# Patient Record
Sex: MALE | Race: WHITE | NOT HISPANIC OR LATINO | Employment: OTHER | URBAN - METROPOLITAN AREA
[De-identification: names, ages, dates, MRNs, and addresses within clinical notes are randomized per-mention and may not be internally consistent; named-entity substitution may affect disease eponyms.]

---

## 2021-11-07 ENCOUNTER — APPOINTMENT (OUTPATIENT)
Dept: CT IMAGING | Facility: HOSPITAL | Age: 81
End: 2021-11-07

## 2021-11-07 ENCOUNTER — HOSPITAL ENCOUNTER (INPATIENT)
Facility: HOSPITAL | Age: 81
LOS: 5 days | Discharge: NURSING, REHAB OR LONG-TERM CARE | End: 2021-11-12
Attending: EMERGENCY MEDICINE | Admitting: INTERNAL MEDICINE

## 2021-11-07 ENCOUNTER — APPOINTMENT (OUTPATIENT)
Dept: GENERAL RADIOLOGY | Facility: HOSPITAL | Age: 81
End: 2021-11-07

## 2021-11-07 DIAGNOSIS — R26.2 DIFFICULTY IN WALKING: ICD-10-CM

## 2021-11-07 DIAGNOSIS — R13.12 DYSPHAGIA, OROPHARYNGEAL: ICD-10-CM

## 2021-11-07 DIAGNOSIS — Z78.9 DECREASED ACTIVITIES OF DAILY LIVING (ADL): ICD-10-CM

## 2021-11-07 DIAGNOSIS — N17.9 ACUTE RENAL FAILURE, UNSPECIFIED ACUTE RENAL FAILURE TYPE (HCC): ICD-10-CM

## 2021-11-07 DIAGNOSIS — S72.001A CLOSED FRACTURE OF RIGHT HIP, INITIAL ENCOUNTER (HCC): Primary | ICD-10-CM

## 2021-11-07 LAB
ABO GROUP BLD: NORMAL
ABO GROUP BLD: NORMAL
ALBUMIN SERPL-MCNC: 4.2 G/DL (ref 3.5–5.2)
ALBUMIN/GLOB SERPL: 1.5 G/DL
ALP SERPL-CCNC: 80 U/L (ref 39–117)
ALT SERPL W P-5'-P-CCNC: 19 U/L (ref 1–41)
ANION GAP SERPL CALCULATED.3IONS-SCNC: 11.2 MMOL/L (ref 5–15)
APTT PPP: 23.4 SECONDS (ref 22.2–34.2)
AST SERPL-CCNC: 34 U/L (ref 1–40)
BASOPHILS # BLD AUTO: 0.06 10*3/MM3 (ref 0–0.2)
BASOPHILS NFR BLD AUTO: 0.6 % (ref 0–1.5)
BILIRUB SERPL-MCNC: 0.4 MG/DL (ref 0–1.2)
BLD GP AB SCN SERPL QL: NEGATIVE
BUN SERPL-MCNC: 28 MG/DL (ref 8–23)
BUN/CREAT SERPL: 16.7 (ref 7–25)
CALCIUM SPEC-SCNC: 9.6 MG/DL (ref 8.6–10.5)
CHLORIDE SERPL-SCNC: 101 MMOL/L (ref 98–107)
CO2 SERPL-SCNC: 24.8 MMOL/L (ref 22–29)
CREAT SERPL-MCNC: 1.68 MG/DL (ref 0.76–1.27)
DEPRECATED RDW RBC AUTO: 45.2 FL (ref 37–54)
EOSINOPHIL # BLD AUTO: 0.06 10*3/MM3 (ref 0–0.4)
EOSINOPHIL NFR BLD AUTO: 0.6 % (ref 0.3–6.2)
ERYTHROCYTE [DISTWIDTH] IN BLOOD BY AUTOMATED COUNT: 13.2 % (ref 12.3–15.4)
GFR SERPL CREATININE-BSD FRML MDRD: 39 ML/MIN/1.73
GLOBULIN UR ELPH-MCNC: 2.8 GM/DL
GLUCOSE SERPL-MCNC: 112 MG/DL (ref 65–99)
HCT VFR BLD AUTO: 43.6 % (ref 37.5–51)
HGB BLD-MCNC: 14.7 G/DL (ref 13–17.7)
IMM GRANULOCYTES # BLD AUTO: 0.05 10*3/MM3 (ref 0–0.05)
IMM GRANULOCYTES NFR BLD AUTO: 0.5 % (ref 0–0.5)
INR PPP: 0.95 (ref 2–3)
LYMPHOCYTES # BLD AUTO: 1.35 10*3/MM3 (ref 0.7–3.1)
LYMPHOCYTES NFR BLD AUTO: 12.9 % (ref 19.6–45.3)
MCH RBC QN AUTO: 31.7 PG (ref 26.6–33)
MCHC RBC AUTO-ENTMCNC: 33.7 G/DL (ref 31.5–35.7)
MCV RBC AUTO: 94.2 FL (ref 79–97)
MONOCYTES # BLD AUTO: 0.75 10*3/MM3 (ref 0.1–0.9)
MONOCYTES NFR BLD AUTO: 7.1 % (ref 5–12)
NEUTROPHILS NFR BLD AUTO: 78.3 % (ref 42.7–76)
NEUTROPHILS NFR BLD AUTO: 8.23 10*3/MM3 (ref 1.7–7)
NRBC BLD AUTO-RTO: 0 /100 WBC (ref 0–0.2)
PLATELET # BLD AUTO: 200 10*3/MM3 (ref 140–450)
PMV BLD AUTO: 11.1 FL (ref 6–12)
POTASSIUM SERPL-SCNC: 5.6 MMOL/L (ref 3.5–5.2)
PROT SERPL-MCNC: 7 G/DL (ref 6–8.5)
PROTHROMBIN TIME: 10.1 SECONDS (ref 9.4–12)
RBC # BLD AUTO: 4.63 10*6/MM3 (ref 4.14–5.8)
RH BLD: POSITIVE
RH BLD: POSITIVE
SODIUM SERPL-SCNC: 137 MMOL/L (ref 136–145)
T&S EXPIRATION DATE: NORMAL
WBC # BLD AUTO: 10.5 10*3/MM3 (ref 3.4–10.8)

## 2021-11-07 PROCEDURE — 25010000002 ONDANSETRON PER 1 MG: Performed by: EMERGENCY MEDICINE

## 2021-11-07 PROCEDURE — 80053 COMPREHEN METABOLIC PANEL: CPT | Performed by: EMERGENCY MEDICINE

## 2021-11-07 PROCEDURE — 86901 BLOOD TYPING SEROLOGIC RH(D): CPT | Performed by: EMERGENCY MEDICINE

## 2021-11-07 PROCEDURE — 71045 X-RAY EXAM CHEST 1 VIEW: CPT

## 2021-11-07 PROCEDURE — 85610 PROTHROMBIN TIME: CPT | Performed by: EMERGENCY MEDICINE

## 2021-11-07 PROCEDURE — 86900 BLOOD TYPING SEROLOGIC ABO: CPT | Performed by: EMERGENCY MEDICINE

## 2021-11-07 PROCEDURE — 86850 RBC ANTIBODY SCREEN: CPT | Performed by: EMERGENCY MEDICINE

## 2021-11-07 PROCEDURE — U0003 INFECTIOUS AGENT DETECTION BY NUCLEIC ACID (DNA OR RNA); SEVERE ACUTE RESPIRATORY SYNDROME CORONAVIRUS 2 (SARS-COV-2) (CORONAVIRUS DISEASE [COVID-19]), AMPLIFIED PROBE TECHNIQUE, MAKING USE OF HIGH THROUGHPUT TECHNOLOGIES AS DESCRIBED BY CMS-2020-01-R: HCPCS | Performed by: EMERGENCY MEDICINE

## 2021-11-07 PROCEDURE — 25010000002 HYDROMORPHONE 1 MG/ML SOLUTION: Performed by: EMERGENCY MEDICINE

## 2021-11-07 PROCEDURE — 85730 THROMBOPLASTIN TIME PARTIAL: CPT | Performed by: EMERGENCY MEDICINE

## 2021-11-07 PROCEDURE — 25010000002 MORPHINE PER 10 MG: Performed by: INTERNAL MEDICINE

## 2021-11-07 PROCEDURE — 71250 CT THORAX DX C-: CPT

## 2021-11-07 PROCEDURE — 93010 ELECTROCARDIOGRAM REPORT: CPT | Performed by: INTERNAL MEDICINE

## 2021-11-07 PROCEDURE — 99284 EMERGENCY DEPT VISIT MOD MDM: CPT

## 2021-11-07 PROCEDURE — U0005 INFEC AGEN DETEC AMPLI PROBE: HCPCS | Performed by: EMERGENCY MEDICINE

## 2021-11-07 PROCEDURE — 93005 ELECTROCARDIOGRAM TRACING: CPT | Performed by: EMERGENCY MEDICINE

## 2021-11-07 PROCEDURE — 94799 UNLISTED PULMONARY SVC/PX: CPT

## 2021-11-07 PROCEDURE — 86900 BLOOD TYPING SEROLOGIC ABO: CPT

## 2021-11-07 PROCEDURE — 85025 COMPLETE CBC W/AUTO DIFF WBC: CPT | Performed by: EMERGENCY MEDICINE

## 2021-11-07 PROCEDURE — 99223 1ST HOSP IP/OBS HIGH 75: CPT | Performed by: INTERNAL MEDICINE

## 2021-11-07 PROCEDURE — 72170 X-RAY EXAM OF PELVIS: CPT

## 2021-11-07 PROCEDURE — 73552 X-RAY EXAM OF FEMUR 2/>: CPT

## 2021-11-07 PROCEDURE — 86901 BLOOD TYPING SEROLOGIC RH(D): CPT

## 2021-11-07 RX ORDER — CEFAZOLIN SODIUM 2 G/100ML
2 INJECTION, SOLUTION INTRAVENOUS
Status: COMPLETED | OUTPATIENT
Start: 2021-11-08 | End: 2021-11-08

## 2021-11-07 RX ORDER — SODIUM CHLORIDE 0.9 % (FLUSH) 0.9 %
10 SYRINGE (ML) INJECTION AS NEEDED
Status: DISCONTINUED | OUTPATIENT
Start: 2021-11-07 | End: 2021-11-12 | Stop reason: HOSPADM

## 2021-11-07 RX ORDER — HYDROCODONE BITARTRATE AND ACETAMINOPHEN 7.5; 325 MG/1; MG/1
1 TABLET ORAL EVERY 4 HOURS PRN
Status: DISCONTINUED | OUTPATIENT
Start: 2021-11-07 | End: 2021-11-08

## 2021-11-07 RX ORDER — ONDANSETRON 2 MG/ML
4 INJECTION INTRAMUSCULAR; INTRAVENOUS ONCE
Status: COMPLETED | OUTPATIENT
Start: 2021-11-07 | End: 2021-11-07

## 2021-11-07 RX ORDER — SIMVASTATIN 20 MG
20 TABLET ORAL NIGHTLY
COMMUNITY

## 2021-11-07 RX ORDER — MORPHINE SULFATE 2 MG/ML
2 INJECTION, SOLUTION INTRAMUSCULAR; INTRAVENOUS EVERY 4 HOURS PRN
Status: DISCONTINUED | OUTPATIENT
Start: 2021-11-07 | End: 2021-11-08

## 2021-11-07 RX ORDER — SODIUM CHLORIDE 0.9 % (FLUSH) 0.9 %
10 SYRINGE (ML) INJECTION EVERY 12 HOURS SCHEDULED
Status: DISCONTINUED | OUTPATIENT
Start: 2021-11-07 | End: 2021-11-12 | Stop reason: HOSPADM

## 2021-11-07 RX ORDER — HYDRALAZINE HYDROCHLORIDE 20 MG/ML
10 INJECTION INTRAMUSCULAR; INTRAVENOUS EVERY 6 HOURS PRN
Status: DISCONTINUED | OUTPATIENT
Start: 2021-11-07 | End: 2021-11-12 | Stop reason: HOSPADM

## 2021-11-07 RX ORDER — NITROGLYCERIN 0.4 MG/1
0.4 TABLET SUBLINGUAL
Status: DISCONTINUED | OUTPATIENT
Start: 2021-11-07 | End: 2021-11-12 | Stop reason: HOSPADM

## 2021-11-07 RX ORDER — ONDANSETRON 2 MG/ML
4 INJECTION INTRAMUSCULAR; INTRAVENOUS EVERY 6 HOURS PRN
Status: DISCONTINUED | OUTPATIENT
Start: 2021-11-07 | End: 2021-11-08

## 2021-11-07 RX ORDER — ASPIRIN 81 MG/1
81 TABLET ORAL DAILY
COMMUNITY
End: 2021-11-12 | Stop reason: HOSPADM

## 2021-11-07 RX ORDER — LISINOPRIL AND HYDROCHLOROTHIAZIDE 25; 20 MG/1; MG/1
1 TABLET ORAL DAILY
COMMUNITY
End: 2021-11-12 | Stop reason: HOSPADM

## 2021-11-07 RX ORDER — ACETAMINOPHEN 325 MG/1
650 TABLET ORAL EVERY 4 HOURS PRN
Status: DISCONTINUED | OUTPATIENT
Start: 2021-11-07 | End: 2021-11-12 | Stop reason: HOSPADM

## 2021-11-07 RX ORDER — ATORVASTATIN CALCIUM 10 MG/1
10 TABLET, FILM COATED ORAL DAILY
Status: DISCONTINUED | OUTPATIENT
Start: 2021-11-08 | End: 2021-11-12

## 2021-11-07 RX ORDER — SODIUM CHLORIDE 9 MG/ML
50 INJECTION, SOLUTION INTRAVENOUS CONTINUOUS
Status: DISCONTINUED | OUTPATIENT
Start: 2021-11-07 | End: 2021-11-09

## 2021-11-07 RX ADMIN — HYDROMORPHONE HYDROCHLORIDE 1 MG: 1 INJECTION, SOLUTION INTRAMUSCULAR; INTRAVENOUS; SUBCUTANEOUS at 18:42

## 2021-11-07 RX ADMIN — SODIUM CHLORIDE 100 ML/HR: 9 INJECTION, SOLUTION INTRAVENOUS at 20:03

## 2021-11-07 RX ADMIN — HYDROMORPHONE HYDROCHLORIDE 1 MG: 1 INJECTION, SOLUTION INTRAMUSCULAR; INTRAVENOUS; SUBCUTANEOUS at 16:58

## 2021-11-07 RX ADMIN — MORPHINE SULFATE 2 MG: 2 INJECTION, SOLUTION INTRAMUSCULAR; INTRAVENOUS at 21:03

## 2021-11-07 RX ADMIN — ONDANSETRON 4 MG: 2 INJECTION INTRAMUSCULAR; INTRAVENOUS at 16:58

## 2021-11-07 RX ADMIN — SODIUM CHLORIDE 100 ML/HR: 9 INJECTION, SOLUTION INTRAVENOUS at 21:03

## 2021-11-07 NOTE — ED PROVIDER NOTES
Time: 4:23 PM EST  Arrived by: ambulance  Chief Complaint: Hip Pain  History provided by: Patient  History is limited by: N/A     History of Present Illness:  Patient is a 81 y.o. year old male that presents to the emergency department with severe right hip pain that began today when he fell down onto [place]. He has not been able to move or stand on his right leg.      Hip Pain  Location:  Right hip  Quality:  Pain  Severity:  Severe  Context:  Fall  Associated symptoms: no abdominal pain, no chest pain, no diarrhea, no fever, no headaches, no nausea, no shortness of breath, no sore throat and no vomiting        Patient Care Team  Primary Care Provider: Provider, No Known    Past Medical History:     No Known Allergies  Past Medical History:   Diagnosis Date   • Hyperlipidemia    • Hypertension      Past Surgical History:   Procedure Laterality Date   • FEMUR IM NAILING/RODDING Right 11/8/2021    Procedure: FEMUR INTRAMEDULLARY NAILING/RODDING;  Surgeon: Aristides Nevarez MD;  Location: [facility];  Service: Orthopedics;  Laterality: Right;     History reviewed. No pertinent family history.    Home Medications:  Prior to Admission medications    Not on File        Social History:   Social History     Tobacco Use   • Smoking status: Former Smoker   • Smokeless tobacco: Never Used   Vaping Use   • Vaping Use: Never used   Substance Use Topics   • Alcohol use: Never   • Drug use: Never     Recent travel: not applicable     Review of Systems:  Review of Systems   Constitutional: Negative for chills and fever.   HENT: Negative for sore throat.    Eyes: Negative for photophobia.   Respiratory: Negative for shortness of breath.    Cardiovascular: Negative for chest pain.   Gastrointestinal: Negative for abdominal pain, diarrhea, nausea and vomiting.   Genitourinary: Negative for dysuria.   Musculoskeletal: Positive for arthralgias (right hip). Negative for neck pain.   Skin: Negative for wound.   Neurological:  "Negative for headaches.   All other systems reviewed and are negative.       Physical Exam:  /55 (BP Location: Right arm, Patient Position: Lying)   Pulse 92   Temp 97.7 °F (36.5 °C) (Oral)   Resp 16   Ht 172.7 cm (67.99\")   Wt 82.7 kg (182 lb 5.1 oz)   SpO2 96%   BMI 27.73 kg/m²     Physical Exam  Vitals and nursing note reviewed.   Constitutional:       General: He is not in acute distress.  HENT:      Head: Normocephalic and atraumatic.   Eyes:      Extraocular Movements: Extraocular movements intact.   Cardiovascular:      Rate and Rhythm: Normal rate and regular rhythm.   Pulmonary:      Effort: Pulmonary effort is normal. No respiratory distress.      Breath sounds: Normal breath sounds.   Abdominal:      General: Abdomen is flat.      Palpations: Abdomen is soft.      Tenderness: There is no abdominal tenderness.   Musculoskeletal:         General: Tenderness (right hip) present.      Cervical back: Normal range of motion and neck supple.      Comments: Right leg is externally rotated and shortened. Pain with any ROM of the right leg.    Skin:     General: Skin is warm and dry.      Capillary Refill: Capillary refill takes less than 2 seconds.   Neurological:      Mental Status: He is alert and oriented to person, place, and time. Mental status is at baseline.                Medications in the Emergency Department:  Medications   sodium chloride 0.9 % flush 10 mL ( Intravenous MAR Unhold 11/8/21 1918)   atorvastatin (LIPITOR) tablet 10 mg (10 mg Oral Given 11/9/21 0814)   nitroglycerin (NITROSTAT) SL tablet 0.4 mg (has no administration in time range)   sodium chloride 0.9 % flush 10 mL (10 mL Intravenous Not Given 11/9/21 0816)   sodium chloride 0.9 % flush 10 mL (has no administration in time range)   acetaminophen (TYLENOL) tablet 650 mg (has no administration in time range)   hydrALAZINE (APRESOLINE) injection 10 mg (has no administration in time range)   enoxaparin (LOVENOX) syringe 40 mg " (40 mg Subcutaneous Given 11/9/21 0814)   HYDROcodone-acetaminophen (NORCO) 7.5-325 MG per tablet 1 tablet (1 tablet Oral Given 11/9/21 0116)   morphine injection 1 mg (has no administration in time range)     And   naloxone (NARCAN) injection 0.4 mg (has no administration in time range)   HYDROcodone-acetaminophen (NORCO) 7.5-325 MG per tablet 2 tablet (2 tablets Oral Given 11/9/21 1354)   morphine injection 2 mg (has no administration in time range)     And   naloxone (NARCAN) injection 0.4 mg (has no administration in time range)   ondansetron (ZOFRAN) injection 4 mg (has no administration in time range)   sennosides-docusate (PERICOLACE) 8.6-50 MG per tablet 2 tablet (2 tablets Oral Given 11/9/21 1739)     And   polyethylene glycol (MIRALAX) packet 17 g (has no administration in time range)     And   bisacodyl (DULCOLAX) EC tablet 5 mg (has no administration in time range)     And   bisacodyl (DULCOLAX) suppository 10 mg (has no administration in time range)   aluminum-magnesium hydroxide-simethicone (MAALOX MAX) 400-400-40 MG/5ML suspension 15 mL (has no administration in time range)   calcium carbonate (TUMS) chewable tablet 500 mg (200 mg elemental) (has no administration in time range)   HYDROmorphone (DILAUDID) injection 1 mg (1 mg Intravenous Given 11/7/21 1658)   ondansetron (ZOFRAN) injection 4 mg (4 mg Intravenous Given 11/7/21 1658)   HYDROmorphone (DILAUDID) injection 1 mg (1 mg Intravenous Given 11/7/21 1842)   ceFAZolin in dextrose (ANCEF) IVPB solution 2 g (2 g Intravenous New Bag 11/8/21 1552)   ceFAZolin in dextrose (ANCEF) IVPB solution 2 g (2 g Intravenous New Bag 11/9/21 1735)        Labs  Lab Results (last 24 hours)     Procedure Component Value Units Date/Time    CBC (No Diff) [[id number]]  (Abnormal) Collected: 11/09/21 0416    Specimen: Blood Updated: 11/09/21 0447     WBC 11.66 10*3/mm3      RBC 3.56 10*6/mm3      Hemoglobin 11.4 g/dL      Hematocrit 33.8 %      MCV 94.9 fL      MCH 32.0  pg      MCHC 33.7 g/dL      RDW 13.0 %      RDW-SD 46.4 fl      MPV 11.2 fL      Platelets 158 10*3/mm3     Basic Metabolic Panel [[id number]]  (Abnormal) Collected: 11/09/21 0416    Specimen: Blood Updated: 11/09/21 0503     Glucose 149 mg/dL      BUN 22 mg/dL      Creatinine 1.25 mg/dL      Sodium 131 mmol/L      Potassium 4.4 mmol/L      Chloride 98 mmol/L      CO2 24.2 mmol/L      Calcium 8.2 mg/dL      eGFR Non African Amer 55 mL/min/1.73      BUN/Creatinine Ratio 17.6     Anion Gap 8.8 mmol/L     Narrative:      GFR Normal >60  Chronic Kidney Disease <60  Kidney Failure <15      Magnesium [[id number]]  (Normal) Collected: 11/09/21 0416    Specimen: Blood Updated: 11/09/21 0503     Magnesium 1.6 mg/dL            Imaging:  No Radiology Exams Resulted Within Past 24 Hours    Procedures:  Procedures    Progress                            Medical Decision Making:  MDM   81-year-old male patient presents after a fall.  Patient lost his balance and fell onto his right hip on asphalt.  Patient has a right mildly displaced intertrochanteric fracture with comminution of the greater and lesser trochanters.  Patient's pain was improved with pain medication.  Patient will be admitted to the hospital service and Dr. Nevarez will consult from orthopedics.  Patient is also noted to have renal failure without previous labs to compare to and an abnormal chest x-ray with ill-defined opacities in the bilateral lungs without a comparison film available.        Final diagnoses:   Closed fracture of right hip, initial encounter ([facility])   Acute renal failure, unspecified acute renal failure type ([facility])        Disposition:  ED Disposition     ED Disposition Condition Comment    Decision to Admit  Level of Care: Med/Surg [1]   Diagnosis: Closed fracture of right hip, initial encounter ([facility]) [[id number]]   Admitting Physician: KWAME TIDWELL [[id number]]   Attending Physician: KWAME TIDWELL [[id number]]   Certification: I Certify  That Inpatient Hospital Services Are Medically Necessary For Greater Than 2 Midnights            Documentation assistance provided by Carol De La Torre acting as scribe for Bc Mejia DO. Information recorded by the scribe was done at my direction and has been verified and validated by me.        Carol De La Torre  11/07/21 1626       Bc Mejia DO  11/09/21 6338

## 2021-11-08 ENCOUNTER — APPOINTMENT (OUTPATIENT)
Dept: GENERAL RADIOLOGY | Facility: HOSPITAL | Age: 81
End: 2021-11-08

## 2021-11-08 ENCOUNTER — ANESTHESIA EVENT (OUTPATIENT)
Dept: PERIOP | Facility: HOSPITAL | Age: 81
End: 2021-11-08

## 2021-11-08 ENCOUNTER — ANESTHESIA (OUTPATIENT)
Dept: PERIOP | Facility: HOSPITAL | Age: 81
End: 2021-11-08

## 2021-11-08 LAB
ANION GAP SERPL CALCULATED.3IONS-SCNC: 9.5 MMOL/L (ref 5–15)
BUN SERPL-MCNC: 25 MG/DL (ref 8–23)
BUN/CREAT SERPL: 17 (ref 7–25)
CALCIUM SPEC-SCNC: 8.8 MG/DL (ref 8.6–10.5)
CHLORIDE SERPL-SCNC: 100 MMOL/L (ref 98–107)
CO2 SERPL-SCNC: 26.5 MMOL/L (ref 22–29)
CREAT SERPL-MCNC: 1.47 MG/DL (ref 0.76–1.27)
GFR SERPL CREATININE-BSD FRML MDRD: 46 ML/MIN/1.73
GLUCOSE SERPL-MCNC: 116 MG/DL (ref 65–99)
POTASSIUM SERPL-SCNC: 4.6 MMOL/L (ref 3.5–5.2)
SARS-COV-2 RNA RESP QL NAA+PROBE: NOT DETECTED
SODIUM SERPL-SCNC: 136 MMOL/L (ref 136–145)

## 2021-11-08 PROCEDURE — 25010000002 HYDROMORPHONE 1 MG/ML SOLUTION: Performed by: NURSE ANESTHETIST, CERTIFIED REGISTERED

## 2021-11-08 PROCEDURE — C1713 ANCHOR/SCREW BN/BN,TIS/BN: HCPCS | Performed by: ORTHOPAEDIC SURGERY

## 2021-11-08 PROCEDURE — 94761 N-INVAS EAR/PLS OXIMETRY MLT: CPT

## 2021-11-08 PROCEDURE — 25010000002 DEXAMETHASONE PER 1 MG: Performed by: NURSE ANESTHETIST, CERTIFIED REGISTERED

## 2021-11-08 PROCEDURE — 94760 N-INVAS EAR/PLS OXIMETRY 1: CPT

## 2021-11-08 PROCEDURE — 25010000002 HYDROMORPHONE 1 MG/ML SOLUTION

## 2021-11-08 PROCEDURE — 99233 SBSQ HOSP IP/OBS HIGH 50: CPT | Performed by: INTERNAL MEDICINE

## 2021-11-08 PROCEDURE — 0QH636Z INSERTION OF INTRAMEDULLARY INTERNAL FIXATION DEVICE INTO RIGHT UPPER FEMUR, PERCUTANEOUS APPROACH: ICD-10-PCS | Performed by: ORTHOPAEDIC SURGERY

## 2021-11-08 PROCEDURE — 27245 TREAT THIGH FRACTURE: CPT | Performed by: ORTHOPAEDIC SURGERY

## 2021-11-08 PROCEDURE — 99222 1ST HOSP IP/OBS MODERATE 55: CPT | Performed by: ORTHOPAEDIC SURGERY

## 2021-11-08 PROCEDURE — 36415 COLL VENOUS BLD VENIPUNCTURE: CPT | Performed by: INTERNAL MEDICINE

## 2021-11-08 PROCEDURE — 25010000002 FENTANYL CITRATE (PF) 50 MCG/ML SOLUTION: Performed by: NURSE ANESTHETIST, CERTIFIED REGISTERED

## 2021-11-08 PROCEDURE — 25010000002 ONDANSETRON PER 1 MG: Performed by: INTERNAL MEDICINE

## 2021-11-08 PROCEDURE — 25010000002 PROPOFOL 10 MG/ML EMULSION: Performed by: NURSE ANESTHETIST, CERTIFIED REGISTERED

## 2021-11-08 PROCEDURE — 25010000002 MORPHINE PER 10 MG: Performed by: INTERNAL MEDICINE

## 2021-11-08 PROCEDURE — 94762 N-INVAS EAR/PLS OXIMTRY CONT: CPT

## 2021-11-08 PROCEDURE — 94799 UNLISTED PULMONARY SVC/PX: CPT

## 2021-11-08 PROCEDURE — C1769 GUIDE WIRE: HCPCS | Performed by: ORTHOPAEDIC SURGERY

## 2021-11-08 PROCEDURE — 76000 FLUOROSCOPY <1 HR PHYS/QHP: CPT

## 2021-11-08 PROCEDURE — 80048 BASIC METABOLIC PNL TOTAL CA: CPT | Performed by: INTERNAL MEDICINE

## 2021-11-08 PROCEDURE — 0 CEFAZOLIN IN DEXTROSE 2-4 GM/100ML-% SOLUTION: Performed by: ORTHOPAEDIC SURGERY

## 2021-11-08 DEVICE — ZNN CMN NAIL 11.5MMX21.5CM 125R
Type: IMPLANTABLE DEVICE | Site: FEMUR | Status: FUNCTIONAL
Brand: ZIMMER® NATURAL NAIL® SYSTEM

## 2021-11-08 DEVICE — SCRW CORT FA FUL/THRD HEX3.5 TI 5X32.5MM: Type: IMPLANTABLE DEVICE | Site: FEMUR | Status: FUNCTIONAL

## 2021-11-08 DEVICE — ZNN CMN LAG SCREW 10.5X100
Type: IMPLANTABLE DEVICE | Site: FEMUR | Status: FUNCTIONAL
Brand: ZIMMER® NATURAL NAIL® SYSTEM

## 2021-11-08 RX ORDER — NALOXONE HCL 0.4 MG/ML
0.4 VIAL (ML) INJECTION
Status: DISCONTINUED | OUTPATIENT
Start: 2021-11-08 | End: 2021-11-12 | Stop reason: HOSPADM

## 2021-11-08 RX ORDER — PHENYLEPHRINE HCL IN 0.9% NACL 1 MG/10 ML
SYRINGE (ML) INTRAVENOUS AS NEEDED
Status: DISCONTINUED | OUTPATIENT
Start: 2021-11-08 | End: 2021-11-08 | Stop reason: SURG

## 2021-11-08 RX ORDER — OXYCODONE HYDROCHLORIDE 5 MG/1
5 TABLET ORAL
Status: DISCONTINUED | OUTPATIENT
Start: 2021-11-08 | End: 2021-11-08 | Stop reason: HOSPADM

## 2021-11-08 RX ORDER — PROMETHAZINE HYDROCHLORIDE 25 MG/1
25 SUPPOSITORY RECTAL ONCE AS NEEDED
Status: DISCONTINUED | OUTPATIENT
Start: 2021-11-08 | End: 2021-11-08 | Stop reason: HOSPADM

## 2021-11-08 RX ORDER — MEPERIDINE HYDROCHLORIDE 25 MG/ML
12.5 INJECTION INTRAMUSCULAR; INTRAVENOUS; SUBCUTANEOUS
Status: DISCONTINUED | OUTPATIENT
Start: 2021-11-08 | End: 2021-11-08 | Stop reason: HOSPADM

## 2021-11-08 RX ORDER — HYDROCODONE BITARTRATE AND ACETAMINOPHEN 7.5; 325 MG/1; MG/1
2 TABLET ORAL EVERY 4 HOURS PRN
Status: DISCONTINUED | OUTPATIENT
Start: 2021-11-08 | End: 2021-11-12 | Stop reason: HOSPADM

## 2021-11-08 RX ORDER — LIDOCAINE HYDROCHLORIDE 20 MG/ML
INJECTION, SOLUTION INFILTRATION; PERINEURAL AS NEEDED
Status: DISCONTINUED | OUTPATIENT
Start: 2021-11-08 | End: 2021-11-08 | Stop reason: SURG

## 2021-11-08 RX ORDER — SODIUM CHLORIDE 9 MG/ML
9 INJECTION, SOLUTION INTRAVENOUS CONTINUOUS PRN
Status: CANCELLED | OUTPATIENT
Start: 2021-11-08

## 2021-11-08 RX ORDER — SODIUM CHLORIDE 0.9 % (FLUSH) 0.9 %
10 SYRINGE (ML) INJECTION AS NEEDED
Status: CANCELLED | OUTPATIENT
Start: 2021-11-08

## 2021-11-08 RX ORDER — ONDANSETRON 2 MG/ML
4 INJECTION INTRAMUSCULAR; INTRAVENOUS EVERY 6 HOURS PRN
Status: DISCONTINUED | OUTPATIENT
Start: 2021-11-08 | End: 2021-11-12 | Stop reason: HOSPADM

## 2021-11-08 RX ORDER — PROPOFOL 10 MG/ML
VIAL (ML) INTRAVENOUS AS NEEDED
Status: DISCONTINUED | OUTPATIENT
Start: 2021-11-08 | End: 2021-11-08 | Stop reason: SURG

## 2021-11-08 RX ORDER — ONDANSETRON 2 MG/ML
4 INJECTION INTRAMUSCULAR; INTRAVENOUS ONCE AS NEEDED
Status: DISCONTINUED | OUTPATIENT
Start: 2021-11-08 | End: 2021-11-08 | Stop reason: HOSPADM

## 2021-11-08 RX ORDER — CEFAZOLIN SODIUM 2 G/100ML
2 INJECTION, SOLUTION INTRAVENOUS EVERY 8 HOURS
Status: COMPLETED | OUTPATIENT
Start: 2021-11-09 | End: 2021-11-09

## 2021-11-08 RX ORDER — HYDROCODONE BITARTRATE AND ACETAMINOPHEN 7.5; 325 MG/1; MG/1
1 TABLET ORAL EVERY 4 HOURS PRN
Status: DISCONTINUED | OUTPATIENT
Start: 2021-11-08 | End: 2021-11-12 | Stop reason: HOSPADM

## 2021-11-08 RX ORDER — ROCURONIUM BROMIDE 10 MG/ML
INJECTION, SOLUTION INTRAVENOUS AS NEEDED
Status: DISCONTINUED | OUTPATIENT
Start: 2021-11-08 | End: 2021-11-08 | Stop reason: SURG

## 2021-11-08 RX ORDER — PROMETHAZINE HYDROCHLORIDE 12.5 MG/1
25 TABLET ORAL ONCE AS NEEDED
Status: DISCONTINUED | OUTPATIENT
Start: 2021-11-08 | End: 2021-11-08 | Stop reason: HOSPADM

## 2021-11-08 RX ORDER — DEXAMETHASONE SODIUM PHOSPHATE 4 MG/ML
INJECTION, SOLUTION INTRA-ARTICULAR; INTRALESIONAL; INTRAMUSCULAR; INTRAVENOUS; SOFT TISSUE AS NEEDED
Status: DISCONTINUED | OUTPATIENT
Start: 2021-11-08 | End: 2021-11-08 | Stop reason: SURG

## 2021-11-08 RX ORDER — SODIUM CHLORIDE, SODIUM LACTATE, POTASSIUM CHLORIDE, CALCIUM CHLORIDE 600; 310; 30; 20 MG/100ML; MG/100ML; MG/100ML; MG/100ML
INJECTION, SOLUTION INTRAVENOUS CONTINUOUS PRN
Status: DISCONTINUED | OUTPATIENT
Start: 2021-11-08 | End: 2021-11-08 | Stop reason: SURG

## 2021-11-08 RX ORDER — MORPHINE SULFATE 2 MG/ML
2 INJECTION, SOLUTION INTRAMUSCULAR; INTRAVENOUS EVERY 4 HOURS PRN
Status: DISCONTINUED | OUTPATIENT
Start: 2021-11-08 | End: 2021-11-12 | Stop reason: HOSPADM

## 2021-11-08 RX ORDER — SODIUM CHLORIDE 0.9 % (FLUSH) 0.9 %
10 SYRINGE (ML) INJECTION EVERY 12 HOURS SCHEDULED
Status: CANCELLED | OUTPATIENT
Start: 2021-11-08

## 2021-11-08 RX ORDER — MORPHINE SULFATE 2 MG/ML
1 INJECTION, SOLUTION INTRAMUSCULAR; INTRAVENOUS EVERY 4 HOURS PRN
Status: DISCONTINUED | OUTPATIENT
Start: 2021-11-08 | End: 2021-11-12 | Stop reason: HOSPADM

## 2021-11-08 RX ORDER — MAGNESIUM HYDROXIDE 1200 MG/15ML
LIQUID ORAL AS NEEDED
Status: DISCONTINUED | OUTPATIENT
Start: 2021-11-08 | End: 2021-11-08 | Stop reason: HOSPADM

## 2021-11-08 RX ORDER — FENTANYL CITRATE 50 UG/ML
INJECTION, SOLUTION INTRAMUSCULAR; INTRAVENOUS AS NEEDED
Status: DISCONTINUED | OUTPATIENT
Start: 2021-11-08 | End: 2021-11-08 | Stop reason: SURG

## 2021-11-08 RX ADMIN — SODIUM CHLORIDE, POTASSIUM CHLORIDE, SODIUM LACTATE AND CALCIUM CHLORIDE: 600; 310; 30; 20 INJECTION, SOLUTION INTRAVENOUS at 15:54

## 2021-11-08 RX ADMIN — PROPOFOL 100 MG: 10 INJECTION, EMULSION INTRAVENOUS at 16:00

## 2021-11-08 RX ADMIN — HYDROCODONE BITARTRATE AND ACETAMINOPHEN 1 TABLET: 7.5; 325 TABLET ORAL at 05:54

## 2021-11-08 RX ADMIN — ROCURONIUM BROMIDE 50 MG: 10 INJECTION INTRAVENOUS at 16:00

## 2021-11-08 RX ADMIN — SODIUM CHLORIDE, POTASSIUM CHLORIDE, SODIUM LACTATE AND CALCIUM CHLORIDE: 600; 310; 30; 20 INJECTION, SOLUTION INTRAVENOUS at 16:40

## 2021-11-08 RX ADMIN — Medication 100 MCG: at 16:50

## 2021-11-08 RX ADMIN — HYDROMORPHONE HYDROCHLORIDE 0.5 MG: 1 INJECTION, SOLUTION INTRAMUSCULAR; INTRAVENOUS; SUBCUTANEOUS at 17:32

## 2021-11-08 RX ADMIN — HYDROMORPHONE HYDROCHLORIDE 0.5 MG: 1 INJECTION, SOLUTION INTRAMUSCULAR; INTRAVENOUS; SUBCUTANEOUS at 17:22

## 2021-11-08 RX ADMIN — Medication 100 MCG: at 16:46

## 2021-11-08 RX ADMIN — SUGAMMADEX 200 MG: 100 INJECTION, SOLUTION INTRAVENOUS at 16:59

## 2021-11-08 RX ADMIN — HYDROCODONE BITARTRATE AND ACETAMINOPHEN 2 TABLET: 7.5; 325 TABLET ORAL at 20:09

## 2021-11-08 RX ADMIN — FENTANYL CITRATE 75 MCG: 50 INJECTION, SOLUTION INTRAMUSCULAR; INTRAVENOUS at 16:08

## 2021-11-08 RX ADMIN — FENTANYL CITRATE 50 MCG: 50 INJECTION, SOLUTION INTRAMUSCULAR; INTRAVENOUS at 16:40

## 2021-11-08 RX ADMIN — FENTANYL CITRATE 25 MCG: 50 INJECTION, SOLUTION INTRAMUSCULAR; INTRAVENOUS at 16:00

## 2021-11-08 RX ADMIN — FENTANYL CITRATE 50 MCG: 50 INJECTION, SOLUTION INTRAMUSCULAR; INTRAVENOUS at 16:19

## 2021-11-08 RX ADMIN — SODIUM CHLORIDE, PRESERVATIVE FREE 10 ML: 5 INJECTION INTRAVENOUS at 20:30

## 2021-11-08 RX ADMIN — DEXAMETHASONE SODIUM PHOSPHATE 4 MG: 4 INJECTION INTRA-ARTICULAR; INTRALESIONAL; INTRAMUSCULAR; INTRAVENOUS; SOFT TISSUE at 16:19

## 2021-11-08 RX ADMIN — LIDOCAINE HYDROCHLORIDE 80 MG: 20 INJECTION, SOLUTION INFILTRATION; PERINEURAL at 16:00

## 2021-11-08 RX ADMIN — ONDANSETRON 4 MG: 2 INJECTION INTRAMUSCULAR; INTRAVENOUS at 16:19

## 2021-11-08 RX ADMIN — CEFAZOLIN SODIUM 2 G: 2 INJECTION, SOLUTION INTRAVENOUS at 15:52

## 2021-11-08 RX ADMIN — MORPHINE SULFATE 2 MG: 2 INJECTION, SOLUTION INTRAMUSCULAR; INTRAVENOUS at 04:35

## 2021-11-08 NOTE — PLAN OF CARE
Goal Outcome Evaluation:  Plan of Care Reviewed With: patient        Progress: no change  Outcome Summary: Patient resting in bed, VSS, denies pain at current time, NPO for procedure, family aware of situtation

## 2021-11-08 NOTE — ANESTHESIA PREPROCEDURE EVALUATION
Anesthesia Evaluation     NPO Solid Status: > 8 hours  NPO Liquid Status: > 8 hours           Airway   Mallampati: I  TM distance: <3 FB  Neck ROM: full  No difficulty expected  Comment: Small chin.  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   GI/Hepatic/Renal/Endo      Musculoskeletal     Abdominal    Substance History      OB/GYN          Other                        Anesthesia Plan    ASA 2     general and regional   total IV anesthesia(Pt decides to  Prefer general anesthesia)    Anesthetic plan, all risks, benefits, and alternatives have been provided, discussed and informed consent has been obtained with: patient.    Plan discussed with CRNA and attending.

## 2021-11-08 NOTE — PLAN OF CARE
Goal Outcome Evaluation:   Pt stable throughout the night. Pain control has been main concern. NPO, plans for surgery today, 11/8. No other concerns.

## 2021-11-08 NOTE — OP NOTE
FEMUR INTRAMEDULLARY NAILING/RODDING  Procedure Report    Patient Name:  Sukhwinder Marrero  YOB: 1940    Date of Surgery:  11/8/2021     Indications: The patient sustained a mechanical fall and suffered a right intertrochanteric proximal femur fracture.  He was admitted to the hospital and found to be stable for surgery.  We discussed risks and benefits of surgery with the patient and he wished to proceed.  Discussed the risk of bleeding, infection, damage to neurovascular structures, heart attack, stroke, DVT/PE, anesthesia complications including death, leg length discrepancy, deep infection, malunion, nonunion, need for additional procedures, among others.  Informed consent was obtained and he wished to proceed.    Pre-op Diagnosis:   Closed fracture of right hip, initial encounter ([facility]) [S72.001A]       Post-Op Diagnosis Codes:     * Closed fracture of right hip, initial encounter ([facility]) [S72.001A]    Procedure/CPT® Codes:      Procedure(s):  Right FEMUR INTRAMEDULLARY NAILING/RODDING    Staff:  Surgeon(s):  Aristides Nevarez MD         Anesthesia: General    Estimated Blood Loss: 200ml    Implants:    Implant Name Type Inv. Item Serial No.  Lot No. LRB No. Used Action   NAIL CEPH TI 21.5CM 11.5MM SHT RT STRL - [id number] Implant NAIL CEPH TI 21.5CM 11.5MM SHT RT STRL  [employer] [id number] Right 1 Implanted   SCRW LAG CEPH TI 10.9S826XV - [id number] Implant SCRW LAG CEPH TI 10.2S524JO  [employer] [id number] Right 1 Implanted   SCRW JULIET FA FUL/THRD HEX3.5 TI 5X32.5MM - [id number] Implant SCRW JULIET FA FUL/THRD HEX3.5 TI 5X32.5MM  [employer] [id number] Right 1 Implanted       Specimen:          None        Findings: Displaced 3 part intertrochanteric proximal femur fracture    Complications: None    Description of Procedure: The operative site was marked in the preoperative holding area.  The patient was brought to the operating room.  Anesthesia was given.  The patient was placed  supine on the [place] operative table. Well-padded traction boots were placed to the lower extremities. All bony prominences were padded.  We then placed a padded perineal post. Formal time out was held.  The patient received preoperative antibiotic. Traction and internal rotation was placed across the operative lower extremity. The nonoperative leg was extended and adducted.  Fluoroscopic imaging was taken AP and lateral plane to confirm anatomic reduction.    At this point, after prepping and draping, a longitudinal incision was made just proximal to the greater trochanter.  Subcutaneous tissues and fascia were divided.  A guide pin was placed at the tip of the greater trochanter, was driven into the intramedullary canal, centered on the canal on the lateral view.  The opening reamer and soft tissue protector were then used to create an opening hole with the greater trochanter and a ball-tipped guidewire was then inserted down into the distal femur.  This was confirmed to be at the center of the knee on both AP/lateral and fluoroscopic imaging. We then began reaming sequentially the femur, reaming to 2mm greater than the chosen nail size. The nail was inserted over the guide wire. The guide arm was placed onto the nail for aiming the lag screw.  A stab incision was made at the lateral thigh, and the fascia was divided.  The trocar was then inserted to the lateral femur.  The guide pin was drilled in the center of the femoral head on the AP and lateral view, and the lag screw length was measured.  The drill was used to drill for the lag screw and then an appropriately sized lag screw was inserted. At this point, the locking bolt was placed in the proximal nail and was tightened.   The guide for the interlocking screw was placed.  A stab incision was made in the skin and the guide was placed onto the lateral femur.  The screw was inserted after drilling and measuring.  The nail insertion guide was then removed. At this  point, final fluoroscopic images were taken.  The wounds were irrigated.  They were closed with 2-0 Vicryl, skin closed with staples.  A sterile dressing was placed.  The patient was awoken from anesthesia in stable condition.  There were no complications.  All counts were correct.  Stable to recovery.          Aristides Nevarez MD     Date: 11/8/2021  Time: 17:09 EST

## 2021-11-08 NOTE — H&P (VIEW-ONLY)
[facility]   Consult Note    Patient Name: Sukhwinder Marrero  : 1940  MRN: [id number]  Primary Care Physician:  Provider, No Known  Referring Physician: No ref. provider found  Date of admission: 2021    Subjective   Subjective     Reason for Consult/ Chief Complaint: Right hip pain    HPI:  Sukhwinder Marrero is a 81 y.o. male sustained a mechanical fall yesterday around 4 PM.  He was brought to the hospital by EMS and found to have a right intertrochanteric proximal femur fracture.  He was previously ambulatory.  He denies any loss of consciousness or head injury.  He reports right hip pain, without other complaints.  He denies any chest pain or shortness of air.  He has been n.p.o. since yesterday.    Review of Systems   All systems were reviewed and negative except for those mentioned in HPI    Personal History     Past Medical History:   Diagnosis Date   • Hyperlipidemia    • Hypertension        History reviewed. No pertinent surgical history.    Family History: family history is not on file. Otherwise pertinent FHx was reviewed and not pertinent to current issue.    Social History:  reports that he has quit smoking. He has never used smokeless tobacco. He reports that he does not drink alcohol and does not use drugs.    Home Medications:  aspirin, lisinopril-hydrochlorothiazide, and simvastatin    Allergies:  No Known Allergies    Objective    Objective     Vitals:   Temp:  [97.5 °F (36.4 °C)-97.6 °F (36.4 °C)] 97.5 °F (36.4 °C)  Heart Rate:  [69-97] 97  Resp:  [18] 18  BP: (123-154)/() 127/62  Flow (L/min):  [3] 3    Physical Exam:   Constitutional: Awake, alert   Eyes: PERRLA, sclerae anicteric, no conjunctival injection   HENT: NCAT, mucous membranes moist   Neck: Supple, no thyromegaly, no lymphadenopathy, trachea midline   Respiratory: Clear to auscultation bilaterally, nonlabored respirations    Cardiovascular: RRR, no murmurs, rubs, or gallops, palpable pedal pulses  bilaterally   Gastrointestinal: Positive bowel sounds, soft, nontender, nondistended   Musculoskeletal: The right lower extremity is shortened and externally rotated.  Positive EHL, FHL, GS, TA.  Sensation intact to light touch all 5 nerves the foot.  Positive pulses.  Tender to palpation hip.  Pain with hip range of motion.   Psychiatric: Appropriate affect, cooperative   Neurologic: Oriented x 3, strength symmetric in all extremities, Cranial Nerves grossly intact to confrontation, speech clear   Skin: No rashes     Result Review    Result Review:  I have personally reviewed the results from the time of this admission to 11/8/2021 06:54 EST and agree with these findings:  [x]  Laboratory  []  Microbiology  [x]  Radiology  []  EKG/Telemetry   []  Cardiology/Vascular   []  Pathology  []  Old records  []  Other:    Most notable findings include: Right intertrochanteric proximal femur fracture  Assessment/Plan   Assessment / Plan     Brief Patient Summary:  Sukhwinder Marrero is a 81 y.o. male who has right intertrochanteric proximal femur fracture    Active Hospital Problems:  Active Hospital Problems    Diagnosis    • Closed fracture of right hip (HCC)        Plan: We discussed treatment options with the patient,  Including operative versus nonoperative treatment.  We recommended operative treatment with cephalomedullary nailing of the right femur.  Risks and benefits of the surgery were discussed and informed consent was obtained.  Plan for n.p.o. today with surgery later today if medically able.  Thank you for the consultation.        Electronically signed by Aristides Nevarez MD, 11/08/21, 6:54 AM EST.

## 2021-11-08 NOTE — CONSULTS
"Nutrition Services    Patient Name: Sukhwinder Marrero  YOB: 1940  MRN: [id number]  Admission date: 11/7/2021      CLINICAL NUTRITION ASSESSMENT      Reason for Assessment  MST score 2+     H&P:    Past Medical History:   Diagnosis Date   • Hyperlipidemia    • Hypertension         Current Problems:   Active Hospital Problems    Diagnosis    • Closed fracture of right hip (HCC)         Nutrition/Diet History         Narrative     RD following patient for MST score for patient unsure of weight loss. Patient has no weight history per documentation. Patient is currently NPO for surgical fixation of femur fx. No intake documentation at this time. RD will follow up once diet is advanced and monitor intake and any nutrition needs.        Anthropometrics        Current Height, Weight Height: 172.7 cm (67.99\")  Weight: 82.7 kg (182 lb 5.1 oz)   Current BMI Body mass index is 27.73 kg/m².       Weight Hx  Wt Readings from Last 30 Encounters:   11/07/21 2100 82.7 kg (182 lb 5.1 oz)   11/07/21 1604 82.7 kg (182 lb 5.1 oz)            Wt Change Observation none     Estimated/Assessed Needs       Energy Requirements 4384-5752 kcal/d   EST Needs (kcal/day)        Protein Requirements    EST Daily Needs (g/day) 83-99 gm/d       Fluid Requirements     Estimated Needs (mL/day) 8111-6566 ml/d     Labs/Medications         Pertinent Labs Reviewed.   Results from last 7 days   Lab Units 11/08/21  0446 11/07/21  1659   SODIUM mmol/L 136 137   POTASSIUM mmol/L 4.6 5.6*   CHLORIDE mmol/L 100 101   CO2 mmol/L 26.5 24.8   BUN mg/dL 25* 28*   CREATININE mg/dL 1.47* 1.68*   CALCIUM mg/dL 8.8 9.6   BILIRUBIN mg/dL  --  0.4   ALK PHOS U/L  --  80   ALT (SGPT) U/L  --  19   AST (SGOT) U/L  --  34   GLUCOSE mg/dL 116* 112*     Results from last 7 days   Lab Units 11/07/21  1659   HEMOGLOBIN g/dL 14.7   HEMATOCRIT % 43.6     COVID19   Date Value Ref Range Status   11/07/2021 Not Detected Not Detected - Ref. Range Final     No results " found for: HGBA1C      Pertinent Medications Reviewed.     Current Nutrition Orders & Evaluation of Intake       Oral Nutrition     Current PO Diet NPO Diet   Supplement No active supplement orders       Nutrition Diagnosis         Nutrition Dx Problem 1 Inadequate energy Intake related to inadequate oral Intake as evidenced by NPO       Nutrition Intervention         RD will follow up once diet is advanced and monitor intake and any nutrition needs.          Monitor/Evaluation        Monitor Per protocol, Weight, Diet advancement       Nutrition Discharge Plan         To be determined       Electronically signed by:  Katharine Lopez RD  11/08/21 08:56 EST

## 2021-11-08 NOTE — PROGRESS NOTES
[facility]   Hospitalist Progress Note  Date: 2021  Patient Name: Sukhwinder Marrero  : 1940  MRN: [id number]  Date of admission: 2021      Subjective   Subjective     Chief Complaint:   Mechanical fall resulting in right hip pain    Summary:   Sukhwinder Marrero is a 82 y/o male with a h/o HTN, hyperlipidemia who presents after a mechanical fall. States he was outside lifting a chair when his feet got tangled and he fell on his right side. Did not hit head or lose consciousness. Son states he has been getting progressively more unsteady on his feet lately and had another fall in his yard recently. He denies any complaints other than pain in his right hip. He denies any cardiac or pulmonary issues. Never has chest pain or sob. Is fairly active on a normal day.     Interval Followup:   Patient to have surgical repair of right hip later this afternoon with orthopedic surgery.  Given patient's chest x-ray, CT was ordered.  CT shows bilateral infiltrates seen at the bases, discussed with patient he denies any complaints of shortness of breath cough or congestion.  Patient has been in his usual state of health for some time.  Patient's labs have improved since admission, patient's potassium is back down to normal, creatinine has improved.  Patient's pain is well controlled at this time if he does not move    Review of Systems   All systems were reviewed and negative except for: Patient states if he does not move his right hip pain is controlled    Objective   Objective     Vitals:   Temp:  [97.4 °F (36.3 °C)-97.6 °F (36.4 °C)] 97.4 °F (36.3 °C)  Heart Rate:  [69-97] 77  Resp:  [18] 18  BP: (123-154)/() 126/75  Flow (L/min):  [2-3] 3  Physical Exam   Gen: NAD, conversant, alert and oriented x3  Eyes: conjunctiva clear, moist mucous membranes  HENT: Atraumatic, oropharynx clear with moist mucous membranes  Neck: No lymphadenopathy, no thyromegaly  Resp: normal respiratory effort, CTAB, no crackles  CV:  RRR, no MRGs, no peripheral edema  GI: Soft, nontender, nondistended, (+) BS.  M/S: Patient's right lower extremity shortened and externally rotated, neurovascularly distally intact  Neuro: normal motor function in all 4 ext, CN 2-12 intact    Result Review    Result Review:  I have personally reviewed the results from the time of this admission to 11/8/2021 11:03 EST and agree with these findings:  [x]  Laboratory  []  Microbiology  [x]  Radiology  [x]  EKG/Telemetry   [x]  Cardiology/Vascular   []  Pathology  []  Old records  []  Other:    Assessment/Plan   Assessment / Plan     Assessment/Plan:  Right intertrochanteric femur fracture  Mechanical ground-level fall  Bibasilar opacities on CT scan  Hyperkalemia  NAEL  Hypertension  Hyperlipidemia    Plan:  Patient admitted to the hospital for further care and management  Orthopedic surgery has been consulted, appreciate assistance  Plans for patient to have cephalomedullary nailing of the right femur  CT of chest reviewed, patient with no complaints of shortness of breath nor aspirations, continue to monitor  Patient has medications available for pain control prior to surgery  Patient remains n.p.o. at this time, will resume diet following surgery  Patient's IV fluids have been reduced, will continue at this time given NAEL  No baseline creatinine has been found for patient however has improved since admission  Patient's hyperkalemia has resolved, continue to monitor  Patient will be evaluated by PT and OT following surgery today  Patient will likely benefit from a SNF discharge given his mechanism of injury  DVT prophylaxis to be initiated following surgery per orthopedic surgery  Discussed plan with RN.    DVT prophylaxis:  Mechanical DVT prophylaxis orders are present.    CODE STATUS:          Electronically signed by Yon Guillen MD, 11/08/21, 11:03 AM EST.

## 2021-11-08 NOTE — H&P
Hospitalist H&P Note    Patient Identification:  Name: Sukhwinder Marrero  Age: 81 y.o.  MRN: [id number]                          Chief Complaint:  I fell    History of Present Illness:  82 y/o male with a h/o HTN, HPLD who presents after a mechanical fall. States he was outside lifting a chair when his feet got tangled and he fell on his R side. Did not hit head or lose consciousness. Son states he has been getting progressively more unsteady on his feet lately and had another fall in his yard recently. He denies any complaints other than pain in his R hip. He denies any cardiac or pulmonary issues. Never has chest pain or sob. Is fairly active on a normal day.     Review of Systems:  Constitutional:  No fever or chills, no weight loss  Eyes:  No blurry or double vision  HENT:  No nasal congestion or sore throat   Respiratory:  No cough or shortness of breath   Cardiovascular:  No chest pain, palpitations, or edema   GI:  No abdominal pain, nausea, vomiting, bloody stools or diarrhea   :  No dysuria or hematuria  Musculoskeletal:  No back pain, +R hip pain  Skin:  No rash, no lumps  Neurologic:  No headache, focal weakness or dizziness  Endocrine:  No polyuria or polydipsia   Lymphatic:  No swollen glands   Psychiatric:  No depression or anxiety     Past Medical History:   Diagnosis Date   • Hyperlipidemia    • Hypertension        History reviewed. No pertinent surgical history.     History reviewed. No pertinent family history. No history cancer    Social History     Tobacco Use   • Smoking status: Never Smoker   • Smokeless tobacco: Not on file   Substance Use Topics   • Alcohol use: Never        No Known Allergies    (Not in a hospital admission)      Objective:  Vitals:   Temp:  [97.6 °F (36.4 °C)] 97.6 °F (36.4 °C)  Heart Rate:  [69-91] 91  Resp:  [18] 18  BP: (134-154)/() 154/103    Physical Exam:  Gen: NAD, conversant.   Eyes: conjunctiva clear, moist mucous membranes  HENT: Atraumatic, oropharynx  clear with moist mucous membranes  Neck: No lymphadenopathy, no thyromegaly  Resp: normal respiratory effort, CTAB  CV: RRR, no MRGs, no peripheral edema  GI: Soft, nontender, nondistended, (+) BS.  Psych: Alert and Oriented x 3, Mood and affect appropriate to situation  Skin: warm and dry on palpation. No rash or ulcers on inspection.  M/S: bilateral UE and bilateral LE muscle mass and tone WNL for age  Neuro: normal motor function in all 4 ext, CN 2-12 intact    Data Review:  I have personally reviewed and interpreted all labs and imaging as well as all outside records    Labs:  Lab Results (last 24 hours)     Procedure Component Value Units Date/Time    COVID-19,CEPHEID/KEVEN/BDMAX,COR/ÁNGEL/PAD/JC IN-HOUSE(OR EMERGENT/ADD-ON),NP SWAB IN TRANSPORT MEDIA 3-4 HR TAT, RT-PCR - Swab, Nasopharynx [[id number]] Collected: 11/07/21 1920    Specimen: Swab from Nasopharynx Updated: 11/07/21 1928    Comprehensive Metabolic Panel [[id number]]  (Abnormal) Collected: 11/07/21 1659    Specimen: Blood Updated: 11/07/21 1735     Glucose 112 mg/dL      BUN 28 mg/dL      Creatinine 1.68 mg/dL      Sodium 137 mmol/L      Potassium 5.6 mmol/L      Comment: Slight hemolysis detected by analyzer. Results may be affected.        Chloride 101 mmol/L      CO2 24.8 mmol/L      Calcium 9.6 mg/dL      Total Protein 7.0 g/dL      Albumin 4.20 g/dL      ALT (SGPT) 19 U/L      AST (SGOT) 34 U/L      Alkaline Phosphatase 80 U/L      Total Bilirubin 0.4 mg/dL      eGFR Non African Amer 39 mL/min/1.73      Globulin 2.8 gm/dL      A/G Ratio 1.5 g/dL      BUN/Creatinine Ratio 16.7     Anion Gap 11.2 mmol/L     Narrative:      GFR Normal >60  Chronic Kidney Disease <60  Kidney Failure <15      aPTT [[id number]]  (Normal) Collected: 11/07/21 1659    Specimen: Blood Updated: 11/07/21 1728     PTT 23.4 seconds     Protime-INR [[id number]]  (Abnormal) Collected: 11/07/21 1659    Specimen: Blood Updated: 11/07/21 1728     Protime 10.1 Seconds      INR 0.95     Narrative:      Suggested Therapeutic Ranges For Oral Anticoagulant Therapy:  Level of Therapy                      INR Target Range  Standard Dose                            2.0-3.0  High Dose                                2.5-3.5  Patients not receiving anticoagulant  Therapy Normal Range                     0.6-1.2    CBC & Differential [[id number]]  (Abnormal) Collected: 11/07/21 1659    Specimen: Blood Updated: 11/07/21 1716    Narrative:      The following orders were created for panel order CBC & Differential.  Procedure                               Abnormality         Status                     ---------                               -----------         ------                     CBC Auto Differential[[id number]]        Abnormal            Final result                 Please view results for these tests on the individual orders.    CBC Auto Differential [[id number]]  (Abnormal) Collected: 11/07/21 1659    Specimen: Blood Updated: 11/07/21 1716     WBC 10.50 10*3/mm3      RBC 4.63 10*6/mm3      Hemoglobin 14.7 g/dL      Hematocrit 43.6 %      MCV 94.2 fL      MCH 31.7 pg      MCHC 33.7 g/dL      RDW 13.2 %      RDW-SD 45.2 fl      MPV 11.1 fL      Platelets 200 10*3/mm3      Neutrophil % 78.3 %      Lymphocyte % 12.9 %      Monocyte % 7.1 %      Eosinophil % 0.6 %      Basophil % 0.6 %      Immature Grans % 0.5 %      Neutrophils, Absolute 8.23 10*3/mm3      Lymphocytes, Absolute 1.35 10*3/mm3      Monocytes, Absolute 0.75 10*3/mm3      Eosinophils, Absolute 0.06 10*3/mm3      Basophils, Absolute 0.06 10*3/mm3      Immature Grans, Absolute 0.05 10*3/mm3      nRBC 0.0 /100 WBC           Imaging:  Imaging Results (Last 24 Hours)     Procedure Component Value Units Date/Time    CT Chest Without Contrast Diagnostic [[id number]] Resulted: 11/07/21 1910     Updated: 11/07/21 1916    XR Chest 1 View [[id number]] Collected: 11/07/21 1704     Updated: 11/07/21 1708    Narrative:      PROCEDURE: XR CHEST 1 VW      COMPARISON: None     INDICATIONS: cough     FINDINGS:   The heart is normal in size.     Ill-defined 7 cm opacity in the right lower lobe and 4.5 cm opacity in the left lower lobe may   represent pneumonia, aspiration, or possibly scarring.  We have no prior studies for comparison.     Bony structures appear intact.     CONCLUSION:   Ill-defined opacities are seen in the lower lobes with a nonspecific appearance, as above.                  JERMAN ROQUE MD         Electronically Signed and Approved By: JERMAN ROQUE MD on 11/07/2021 at 17:04                     XR Femur 2 View Right [[id number]] Collected: 11/07/21 1703     Updated: 11/07/21 1706    Narrative:      PROCEDURE: XR PELVIS 1 OR 2 VW, 11/07/2021, 16:22  XR FEMUR 2 VW RIGHT, 11/07/2021, 16:22     COMPARISON: None     INDICATIONS: RIGHT HIP PAIN POST FALL     FINDINGS:   The sacrum, pelvis, and proximal left femur appear intact.     Moderately displaced fracture is seen in the intertrochanteric region of the proximal right femur.    The superior aspect of the greater trochanter and the lesser trochanter represent separate fracture   fragments.  No underlying lytic lesion is appreciated.     Moderate tricompartmental degenerative change in the knee is consistent with osteoarthritis.     CONCLUSION:   AP pelvis and right femur series demonstrating moderately displaced fracture in the   intertrochanteric proximal right femur, as above.               JERMAN ROQUE MD         Electronically Signed and Approved By: JERMAN ROQUE MD on 11/07/2021 at 17:03                     XR Pelvis 1 or 2 View [[id number]] Collected: 11/07/21 1703     Updated: 11/07/21 1706    Narrative:      PROCEDURE: XR PELVIS 1 OR 2 VW, 11/07/2021, 16:22  XR FEMUR 2 VW RIGHT, 11/07/2021, 16:22     COMPARISON: None     INDICATIONS: RIGHT HIP PAIN POST FALL     FINDINGS:   The sacrum, pelvis, and proximal left femur appear intact.     Moderately displaced fracture is seen in the  intertrochanteric region of the proximal right femur.    The superior aspect of the greater trochanter and the lesser trochanter represent separate fracture   fragments.  No underlying lytic lesion is appreciated.     Moderate tricompartmental degenerative change in the knee is consistent with osteoarthritis.     CONCLUSION:   AP pelvis and right femur series demonstrating moderately displaced fracture in the   intertrochanteric proximal right femur, as above.               JERMAN ROQUE MD         Electronically Signed and Approved By: JERMAN ROQUE MD on 11/07/2021 at 17:03                           Assessment:    Closed fracture of right hip (HCC)      Plan:  R Intertrochanteric femur fracture  - xray with moderately displaced fracture in the intertrochanteric proximal right femur  - pain control  - ortho to eval  - npo, hold any blood thinners    Abnormal CXR  - CXR shows some ill-defined, nonspecific, opacities in b/l lower lobes  - CT chest ordered to further eval    Hyperkalemia  - mild  - will hold lisinopril and recheck in am  - treat if still trending up    NAEL vs CKD3  - unclear what baseline Cr is, no prior to compare to  - gentle IVF overnight and recheck in am  - holding lisinopril and HCTZ    HTN  - holding home meds for above  - prn hydralazine    HPLD  - statin    Lamont Grossman MD  Hospitalist Group  11/7/2021  19:29 EST

## 2021-11-08 NOTE — CONSULTS
[facility]   Consult Note    Patient Name: Sukhwinder Marrero  : 1940  MRN: [id number]  Primary Care Physician:  Provider, No Known  Referring Physician: No ref. provider found  Date of admission: 2021    Subjective   Subjective     Reason for Consult/ Chief Complaint: Right hip pain    HPI:  Sukhwinder Marrero is a 81 y.o. male sustained a mechanical fall yesterday around 4 PM.  He was brought to the hospital by EMS and found to have a right intertrochanteric proximal femur fracture.  He was previously ambulatory.  He denies any loss of consciousness or head injury.  He reports right hip pain, without other complaints.  He denies any chest pain or shortness of air.  He has been n.p.o. since yesterday.    Review of Systems   All systems were reviewed and negative except for those mentioned in HPI    Personal History     Past Medical History:   Diagnosis Date   • Hyperlipidemia    • Hypertension        History reviewed. No pertinent surgical history.    Family History: family history is not on file. Otherwise pertinent FHx was reviewed and not pertinent to current issue.    Social History:  reports that he has quit smoking. He has never used smokeless tobacco. He reports that he does not drink alcohol and does not use drugs.    Home Medications:  aspirin, lisinopril-hydrochlorothiazide, and simvastatin    Allergies:  No Known Allergies    Objective    Objective     Vitals:   Temp:  [97.5 °F (36.4 °C)-97.6 °F (36.4 °C)] 97.5 °F (36.4 °C)  Heart Rate:  [69-97] 97  Resp:  [18] 18  BP: (123-154)/() 127/62  Flow (L/min):  [3] 3    Physical Exam:   Constitutional: Awake, alert   Eyes: PERRLA, sclerae anicteric, no conjunctival injection   HENT: NCAT, mucous membranes moist   Neck: Supple, no thyromegaly, no lymphadenopathy, trachea midline   Respiratory: Clear to auscultation bilaterally, nonlabored respirations    Cardiovascular: RRR, no murmurs, rubs, or gallops, palpable pedal pulses  bilaterally   Gastrointestinal: Positive bowel sounds, soft, nontender, nondistended   Musculoskeletal: The right lower extremity is shortened and externally rotated.  Positive EHL, FHL, GS, TA.  Sensation intact to light touch all 5 nerves the foot.  Positive pulses.  Tender to palpation hip.  Pain with hip range of motion.   Psychiatric: Appropriate affect, cooperative   Neurologic: Oriented x 3, strength symmetric in all extremities, Cranial Nerves grossly intact to confrontation, speech clear   Skin: No rashes     Result Review    Result Review:  I have personally reviewed the results from the time of this admission to 11/8/2021 06:54 EST and agree with these findings:  [x]  Laboratory  []  Microbiology  [x]  Radiology  []  EKG/Telemetry   []  Cardiology/Vascular   []  Pathology  []  Old records  []  Other:    Most notable findings include: Right intertrochanteric proximal femur fracture  Assessment/Plan   Assessment / Plan     Brief Patient Summary:  Sukhwinder Marrero is a 81 y.o. male who has right intertrochanteric proximal femur fracture    Active Hospital Problems:  Active Hospital Problems    Diagnosis    • Closed fracture of right hip (HCC)        Plan: We discussed treatment options with the patient,  Including operative versus nonoperative treatment.  We recommended operative treatment with cephalomedullary nailing of the right femur.  Risks and benefits of the surgery were discussed and informed consent was obtained.  Plan for n.p.o. today with surgery later today if medically able.  Thank you for the consultation.        Electronically signed by Aristides Nevarez MD, 11/08/21, 6:54 AM EST.

## 2021-11-08 NOTE — ANESTHESIA POSTPROCEDURE EVALUATION
Patient: Sukhwinder Marrero    Procedure Summary     Date: 11/08/21 Room / Location: [facility] OR 03 / [facility] MAIN OR    Anesthesia Start: 1554 Anesthesia Stop: 1719    Procedure: FEMUR INTRAMEDULLARY NAILING/RODDING (Right Thigh) Diagnosis:       Closed fracture of right hip, initial encounter ([facility])      (Closed fracture of right hip, initial encounter ([facility]) [S72.001A])    Surgeons: Aristides Nevarez MD Provider:     Anesthesia Type: general, regional ASA Status: 2          Anesthesia Type: general, regional    Vitals  Vitals Value Taken Time   /74 11/08/21 1733   Temp     Pulse 93 11/08/21 1734   Resp     SpO2 92 % 11/08/21 1734   Vitals shown include unvalidated device data.        Post Anesthesia Care and Evaluation    Patient location during evaluation: bedside  Patient participation: complete - patient participated  Level of consciousness: awake  Pain management: adequate  Airway patency: patent  Anesthetic complications: No anesthetic complications  PONV Status: none  Cardiovascular status: acceptable and stable  Respiratory status: acceptable and room air  Hydration status: acceptable    Comments: An Anesthesiologist personally participated in the most demanding procedures (including induction and emergence if applicable) in the anesthesia plan, monitored the course of anesthesia administration at frequent intervals and remained physically present and available for immediate diagnosis and treatment of emergencies.

## 2021-11-09 LAB
ANION GAP SERPL CALCULATED.3IONS-SCNC: 8.8 MMOL/L (ref 5–15)
BUN SERPL-MCNC: 22 MG/DL (ref 8–23)
BUN/CREAT SERPL: 17.6 (ref 7–25)
CALCIUM SPEC-SCNC: 8.2 MG/DL (ref 8.6–10.5)
CHLORIDE SERPL-SCNC: 98 MMOL/L (ref 98–107)
CO2 SERPL-SCNC: 24.2 MMOL/L (ref 22–29)
CREAT SERPL-MCNC: 1.25 MG/DL (ref 0.76–1.27)
DEPRECATED RDW RBC AUTO: 46.4 FL (ref 37–54)
ERYTHROCYTE [DISTWIDTH] IN BLOOD BY AUTOMATED COUNT: 13 % (ref 12.3–15.4)
GFR SERPL CREATININE-BSD FRML MDRD: 55 ML/MIN/1.73
GLUCOSE SERPL-MCNC: 149 MG/DL (ref 65–99)
HCT VFR BLD AUTO: 33.8 % (ref 37.5–51)
HGB BLD-MCNC: 11.4 G/DL (ref 13–17.7)
MAGNESIUM SERPL-MCNC: 1.6 MG/DL (ref 1.6–2.4)
MCH RBC QN AUTO: 32 PG (ref 26.6–33)
MCHC RBC AUTO-ENTMCNC: 33.7 G/DL (ref 31.5–35.7)
MCV RBC AUTO: 94.9 FL (ref 79–97)
PLATELET # BLD AUTO: 158 10*3/MM3 (ref 140–450)
PMV BLD AUTO: 11.2 FL (ref 6–12)
POTASSIUM SERPL-SCNC: 4.4 MMOL/L (ref 3.5–5.2)
RBC # BLD AUTO: 3.56 10*6/MM3 (ref 4.14–5.8)
SODIUM SERPL-SCNC: 131 MMOL/L (ref 136–145)
WBC # BLD AUTO: 11.66 10*3/MM3 (ref 3.4–10.8)

## 2021-11-09 PROCEDURE — 83735 ASSAY OF MAGNESIUM: CPT | Performed by: ORTHOPAEDIC SURGERY

## 2021-11-09 PROCEDURE — 99233 SBSQ HOSP IP/OBS HIGH 50: CPT | Performed by: INTERNAL MEDICINE

## 2021-11-09 PROCEDURE — 97161 PT EVAL LOW COMPLEX 20 MIN: CPT

## 2021-11-09 PROCEDURE — 25010000002 ENOXAPARIN PER 10 MG: Performed by: ORTHOPAEDIC SURGERY

## 2021-11-09 PROCEDURE — 80048 BASIC METABOLIC PNL TOTAL CA: CPT | Performed by: ORTHOPAEDIC SURGERY

## 2021-11-09 PROCEDURE — 0 CEFAZOLIN IN DEXTROSE 2-4 GM/100ML-% SOLUTION: Performed by: ORTHOPAEDIC SURGERY

## 2021-11-09 PROCEDURE — 85027 COMPLETE CBC AUTOMATED: CPT | Performed by: ORTHOPAEDIC SURGERY

## 2021-11-09 RX ORDER — ALUMINA, MAGNESIA, AND SIMETHICONE 2400; 2400; 240 MG/30ML; MG/30ML; MG/30ML
15 SUSPENSION ORAL EVERY 6 HOURS PRN
Status: DISCONTINUED | OUTPATIENT
Start: 2021-11-09 | End: 2021-11-12 | Stop reason: HOSPADM

## 2021-11-09 RX ORDER — POLYETHYLENE GLYCOL 3350 17 G/17G
17 POWDER, FOR SOLUTION ORAL DAILY PRN
Status: DISCONTINUED | OUTPATIENT
Start: 2021-11-09 | End: 2021-11-12 | Stop reason: HOSPADM

## 2021-11-09 RX ORDER — CALCIUM CARBONATE 200(500)MG
1 TABLET,CHEWABLE ORAL 2 TIMES DAILY PRN
Status: DISCONTINUED | OUTPATIENT
Start: 2021-11-09 | End: 2021-11-12 | Stop reason: HOSPADM

## 2021-11-09 RX ORDER — BISACODYL 10 MG
10 SUPPOSITORY, RECTAL RECTAL DAILY PRN
Status: DISCONTINUED | OUTPATIENT
Start: 2021-11-09 | End: 2021-11-12 | Stop reason: HOSPADM

## 2021-11-09 RX ORDER — BISACODYL 5 MG/1
5 TABLET, DELAYED RELEASE ORAL DAILY PRN
Status: DISCONTINUED | OUTPATIENT
Start: 2021-11-09 | End: 2021-11-12 | Stop reason: HOSPADM

## 2021-11-09 RX ORDER — AMOXICILLIN 250 MG
2 CAPSULE ORAL 2 TIMES DAILY
Status: DISCONTINUED | OUTPATIENT
Start: 2021-11-09 | End: 2021-11-12 | Stop reason: HOSPADM

## 2021-11-09 RX ADMIN — CEFAZOLIN SODIUM 2 G: 2 INJECTION, SOLUTION INTRAVENOUS at 01:16

## 2021-11-09 RX ADMIN — DOCUSATE SODIUM 50MG AND SENNOSIDES 8.6MG 2 TABLET: 8.6; 5 TABLET, FILM COATED ORAL at 17:39

## 2021-11-09 RX ADMIN — ENOXAPARIN SODIUM 40 MG: 40 INJECTION SUBCUTANEOUS at 08:14

## 2021-11-09 RX ADMIN — HYDROCODONE BITARTRATE AND ACETAMINOPHEN 1 TABLET: 7.5; 325 TABLET ORAL at 01:16

## 2021-11-09 RX ADMIN — HYDROCODONE BITARTRATE AND ACETAMINOPHEN 2 TABLET: 7.5; 325 TABLET ORAL at 21:36

## 2021-11-09 RX ADMIN — HYDROCODONE BITARTRATE AND ACETAMINOPHEN 2 TABLET: 7.5; 325 TABLET ORAL at 13:54

## 2021-11-09 RX ADMIN — ATORVASTATIN CALCIUM 10 MG: 10 TABLET, FILM COATED ORAL at 08:14

## 2021-11-09 RX ADMIN — SODIUM CHLORIDE, PRESERVATIVE FREE 10 ML: 5 INJECTION INTRAVENOUS at 21:30

## 2021-11-09 RX ADMIN — CEFAZOLIN SODIUM 2 G: 2 INJECTION, SOLUTION INTRAVENOUS at 17:35

## 2021-11-09 RX ADMIN — HYDROCODONE BITARTRATE AND ACETAMINOPHEN 2 TABLET: 7.5; 325 TABLET ORAL at 08:14

## 2021-11-09 RX ADMIN — CEFAZOLIN SODIUM 2 G: 2 INJECTION, SOLUTION INTRAVENOUS at 08:13

## 2021-11-09 NOTE — PROGRESS NOTES
[facility]     Progress Note    Patient Name: Sukhwinder Marrero  : 1940  MRN: [id number]  Primary Care Physician:  Provider, No Known  Date of admission: 2021    Subjective   Subjective     HPI:  Patient Reports doing pretty well this morning.  His pain is controlled with medication.  He denies any chest pain or shortness of air.    Review of Systems   See HPI    Objective   Objective     Vitals:   Temp:  [97.3 °F (36.3 °C)-98.8 °F (37.1 °C)] 97.9 °F (36.6 °C)  Heart Rate:  [74-99] 88  Resp:  [14-18] 16  BP: (121-156)/(45-90) 141/79  Flow (L/min):  [2-6] 2  Physical Exam    General: Alert, no acute distress   Chest: Unlabored breathing, cardiovascular: Regular heart rate   Musculoskeletal: Positive EHL, FHL, GS, TA.  Sensation intact to light touch all 5 nerves the foot.  Positive pulses.  Dressing intact without significant drainage    Result Review      WBC   Date Value Ref Range Status   2021 11.66 (H) 3.40 - 10.80 10*3/mm3 Final     RBC   Date Value Ref Range Status   2021 3.56 (L) 4.14 - 5.80 10*6/mm3 Final     Hemoglobin   Date Value Ref Range Status   2021 11.4 (L) 13.0 - 17.7 g/dL Final     Hematocrit   Date Value Ref Range Status   2021 33.8 (L) 37.5 - 51.0 % Final     MCV   Date Value Ref Range Status   2021 94.9 79.0 - 97.0 fL Final     MCH   Date Value Ref Range Status   2021 32.0 26.6 - 33.0 pg Final     MCHC   Date Value Ref Range Status   2021 33.7 31.5 - 35.7 g/dL Final     RDW   Date Value Ref Range Status   2021 13.0 12.3 - 15.4 % Final     RDW-SD   Date Value Ref Range Status   2021 46.4 37.0 - 54.0 fl Final     MPV   Date Value Ref Range Status   2021 11.2 6.0 - 12.0 fL Final     Platelets   Date Value Ref Range Status   2021 158 140 - 450 10*3/mm3 Final     Neutrophil %   Date Value Ref Range Status   2021 78.3 (H) 42.7 - 76.0 % Final     Lymphocyte %   Date Value Ref Range Status   2021 12.9 (L) 19.6  - 45.3 % Final     Monocyte %   Date Value Ref Range Status   11/07/2021 7.1 5.0 - 12.0 % Final     Eosinophil %   Date Value Ref Range Status   11/07/2021 0.6 0.3 - 6.2 % Final     Basophil %   Date Value Ref Range Status   11/07/2021 0.6 0.0 - 1.5 % Final     Immature Grans %   Date Value Ref Range Status   11/07/2021 0.5 0.0 - 0.5 % Final     Neutrophils, Absolute   Date Value Ref Range Status   11/07/2021 8.23 (H) 1.70 - 7.00 10*3/mm3 Final     Lymphocytes, Absolute   Date Value Ref Range Status   11/07/2021 1.35 0.70 - 3.10 10*3/mm3 Final     Monocytes, Absolute   Date Value Ref Range Status   11/07/2021 0.75 0.10 - 0.90 10*3/mm3 Final     Eosinophils, Absolute   Date Value Ref Range Status   11/07/2021 0.06 0.00 - 0.40 10*3/mm3 Final     Basophils, Absolute   Date Value Ref Range Status   11/07/2021 0.06 0.00 - 0.20 10*3/mm3 Final     Immature Grans, Absolute   Date Value Ref Range Status   11/07/2021 0.05 0.00 - 0.05 10*3/mm3 Final     nRBC   Date Value Ref Range Status   11/07/2021 0.0 0.0 - 0.2 /100 WBC Final        Result Review:  I have personally reviewed the results from the time of this admission to 11/9/2021 07:54 EST and agree with these findings:  [x]  Laboratory  []  Microbiology  [x]  Radiology  []  EKG/Telemetry   []  Cardiology/Vascular   []  Pathology  []  Old records  []  Other:      Assessment/Plan   Assessment / Plan     Brief Patient Summary:  Sukhwinder Marrero is a 81 y.o. male who is postop right hip cephalomedullary nail  Active Hospital Problems:  Active Hospital Problems    Diagnosis    • Closed fracture of right hip (HCC)      Plan: Weightbearing as tolerated.  Physical and Occupational Therapy.  Monitor hemoglobin/hematocrit.  DVT prophylaxis  Pain control.  Discharge planning: Likely will need inpatient rehab when able       DVT prophylaxis:  Medical and mechanical DVT prophylaxis orders are present.    CODE STATUS:      Disposition:  I expect patient to be discharged to rehab when  able.    Electronically signed by Aristides Nevarez MD, 11/09/21, 7:54 AM EST.

## 2021-11-09 NOTE — PROGRESS NOTES
[facility]   Hospitalist Progress Note  Date: 2021  Patient Name: Sukhwinder Marrero  : 1940  MRN: [id number]  Date of admission: 2021      Subjective   Subjective     Chief Complaint:   Mechanical fall resulting in right hip pain    Summary:   Sukhwinder Marrero is a 82 y/o male with a h/o HTN, hyperlipidemia who presents after a mechanical fall. States he was outside lifting a chair when his feet got tangled and he fell on his right side. Did not hit head or lose consciousness. Son states he has been getting progressively more unsteady on his feet lately and had another fall in his yard recently. He denies any complaints other than pain in his right hip. He denies any cardiac or pulmonary issues. Never has chest pain or sob. Is fairly active on a normal day.   Patient had gamma nailing of right hip fracture by Dr. Nevarez on .  CT chest noted.  No pulmonary symptoms.    Interval Followup:   Patient still has moderate to severe pain in the right hip.  No BM yet.  Has been passing gas  Other vitals are stable.  On room air saturation is 93%.    Review of Systems   All systems were reviewed and negative except for: Patient states if he does not move his right hip pain is controlled    Objective   Objective     Vitals:   Temp:  [97.3 °F (36.3 °C)-98.8 °F (37.1 °C)] 97.7 °F (36.5 °C)  Heart Rate:  [82-99] 92  Resp:  [15-18] 16  BP: (103-156)/(45-90) 116/55  Flow (L/min):  [2-6] 2  Physical Exam   Gen: NAD, conversant, alert and oriented x3  Eyes: conjunctiva clear, moist mucous membranes  HENT: Atraumatic, oropharynx clear with moist mucous membranes  Neck: No lymphadenopathy, no thyromegaly  Resp: normal respiratory effort, CTAB, no crackles  CV: RRR, no MRGs, no peripheral edema  GI: Soft, nontender, nondistended, (+) BS.  M/S: Patient's right lower extremity shortened and externally rotated, neurovascularly distally intact  Neuro: normal motor function in 3 ext, limited range of motion  due to pain, CN 2-12 intact    Result Review    Result Review:  I have personally reviewed the results from the time of this admission to 11/9/2021 16:35 EST and agree with these findings:  [x]  Laboratory  []  Microbiology  [x]  Radiology  [x]  EKG/Telemetry   [x]  Cardiology/Vascular   []  Pathology  [x]  Old records  [x]  Other: Medications    Assessment/Plan   Assessment / Plan     Assessment/Plan:  Right intertrochanteric femur fracture.  S/p gamma nailing by Dr. Nevarez on November 9  Mechanical ground-level fall causing above  Bibasilar opacities on CT scan.  Asymptomatic  Hyperkalemia.  Resolved  NAEL.  Resolved  Acute anemia.  Likely postop blood loss and due to IV hydration  Hypertension  Hyperlipidemia    Plan:  Patient admitted to the hospital for further care and management  Orthopedic surgery has been consulted, appreciate assistance  Bowel regimen  CT of chest reviewed, patient with no complaints of shortness of breath nor aspirations, continue to monitor  Patient has medications available for pain control prior to surgery  Patient remains n.p.o. at this time, will resume diet following surgery  DC IV fluids   no baseline creatinine has been found for patient however has improved since admission  Patient's hyperkalemia has resolved, continue to monitor  PT OT  Discussed with .  Patient and wife wants to do rehab at home with home health.  Declined rehab placement although it was strongly recommended   discussed plan with RN.  Discharge home in morning    DVT prophylaxis:  Medical and mechanical DVT prophylaxis orders are present.    CODE STATUS:            Electronically signed by Kostas Roach MD, 11/09/21, 4:39 PM EST.

## 2021-11-09 NOTE — PLAN OF CARE
Goal Outcome Evaluation:   Pt stable throughout the night. No concerns or complaints noted.

## 2021-11-09 NOTE — THERAPY EVALUATION
Acute Care - Physical Therapy Initial Evaluation   [place]     Patient Name: Sukhwinder Marrero  : 1940  MRN: [id number]  Today's Date: 2021   Admit date: 2021     Referring Physician: Kostas Roach MD     Surgery Date:2021 - 2021   Procedure(s) (LRB):  FEMUR INTRAMEDULLARY NAILING/RODDING (Right)           Visit Dx:     ICD-10-CM ICD-9-CM   1. Closed fracture of right hip, initial encounter ([facility])  S72.001A 820.8   2. Acute renal failure, unspecified acute renal failure type ([facility])  N17.9 584.9   3. Difficulty in walking  R26.2 719.7     Patient Active Problem List   Diagnosis   • Closed fracture of right hip (HCC)     Past Medical History:   Diagnosis Date   • Hyperlipidemia    • Hypertension      Past Surgical History:   Procedure Laterality Date   • FEMUR IM NAILING/RODDING Right 2021    Procedure: FEMUR INTRAMEDULLARY NAILING/RODDING;  Surgeon: Aristides Nevarez MD;  Location: [facility] OR;  Service: Orthopedics;  Laterality: Right;     PT Assessment (last 12 hours)     PT Evaluation and Treatment     Row Name 21 1434          Physical Therapy Time and Intention    Subjective Information complains of; pain (P)   -SH     Document Type evaluation (P)   -SH     Mode of Treatment individual therapy; physical therapy (P)   -SH     Patient Effort good (P)   -SH     Symptoms Noted During/After Treatment increased pain (P)   -SH     Row Name 21 1434          General Information    Patient Observations alert; cooperative; agree to therapy (P)   -SH     Prior Level of Function independent:; all household mobility (P)   -SH     Equipment Currently Used at Home walker, rolling (P)   -SH     Existing Precautions/Restrictions fall; hip (P)   -SH     Row Name 21 1434          Living Environment    Current Living Arrangements home/apartment/condo (P)   -SH     Lives With spouse (P)   -SH     Row Name 21 1434          Home Use of Assistive/Adaptive Equipment     Equipment Currently Used at Home walker, rolling (P)   -     Row Name 11/09/21 1434          Range of Motion (ROM)    Range of Motion left lower extremity; ROM is WFL; right lower extremity (P)   limited due to pain  -     Row Name 11/09/21 1434          Strength (Manual Muscle Testing)    Strength (Manual Muscle Testing) right lower extremity strength; left lower extremity strength (P)   -     Left Lower Extremity Strength left LE strength is WFL (P)   -     Right Lower Extremity Strength right LE strength is WFL except; hip; knee (P)   -     Hip, Right (Strength) 2+/5 (P)   -     Knee, Right (Strength) 3+/5 with pain (P)   -     Row Name 11/09/21 1434          Mobility    Extremity Weight-bearing Status right lower extremity (P)   -     Right Lower Extremity (Weight-bearing Status) weight-bearing as tolerated (WBAT) (P)   -     Row Name 11/09/21 1434          Bed Mobility    Bed Mobility bed mobility (all) activities (P)   -     All Activities, [place] (Bed Mobility) minimum assist (75% patient effort); moderate assist (50% patient effort) (P)   -     Bed Mobility, Safety Issues decreased use of arms for pushing/pulling; decreased use of legs for bridging/pushing; impaired trunk control for bed mobility (P)   -     Assistive Device (Bed Mobility) bed rails (P)   -     Row Name 11/09/21 1434          Transfers    Transfers bed-chair transfer; stand pivot/stand step transfer (P)   -     Maintains Weight-bearing Status (Transfers) able to maintain (P)   -     Bed-Chair [place] (Transfers) minimum assist (75% patient effort); moderate assist (50% patient effort) (P)   -     Assistive Device (Bed-Chair Transfers) other (see comments) (P)   no AD  -     Row Name 11/09/21 1434          Stand Pivot/Stand Step Transfer    Stand Pivot/Stand Step [place] (Transfers) minimum assist (75% patient effort); moderate assist (50% patient effort) (P)   -     Assistive Device (Stand  Pivot Stand Step Transfer) other (see comments) (P)   no AD  -     Row Name 11/09/21 1434          Safety Issues, Functional Mobility    Safety Issues Affecting Function (Mobility) safety precaution awareness; safety precautions follow-through/compliance; impulsivity (P)   -     Impairments Affecting Function (Mobility) balance; coordination; endurance/activity tolerance; pain; range of motion (ROM); strength (P)   -     Row Name 11/09/21 1434          Balance    Balance Assessment standing dynamic balance (P)   -     Dynamic Standing Balance moderate impairment (P)   -     Row Name             Wound 11/08/21 1635 Right anterior Incision    Wound - Properties Group Placement Date: 11/08/21  -AM Placement Time: 1635  -AM Side: Right  -AM Orientation: anterior  -AM Primary Wound Type: Incision  -AM, X3      Retired Wound - Properties Group Date first assessed: 11/08/21  -AM Time first assessed: 1635  -AM Side: Right  -AM Primary Wound Type: Incision  -AM, X3      Row Name 11/09/21 1434          Plan of Care Review    Plan of Care Reviewed With patient (P)   -     Progress no change (P)   -     Outcome Summary Pt presents with deficits in strength, range of motion, transfers, bed mobility, and ambulation. Skilled PT services are recommended in order to address these impairments. (P)   -[facility] Name 11/09/21 1434          Physical Therapy Goals    Bed Mobility Goal Selection (PT) bed mobility, PT goal 1 (P)   -     Transfer Goal Selection (PT) transfer, PT goal 1 (P)   -     Gait Training Goal Selection (PT) gait training, PT goal 1 (P)   -     Strength Goal Selection (PT) strength, PT goal 1 (P)   -[facility] Name 11/09/21 1434          Bed Mobility Goal 1 (PT)    Activity/Assistive Device (Bed Mobility Goal 1, PT) bed mobility activities, all (P)   -     [place] Level/Cues Needed (Bed Mobility Goal 1, PT) standby assist (P)   -     Time Frame (Bed Mobility Goal 1, PT) 10 days (P)   -      [facility] Name 11/09/21 1434          Transfer Goal 1 (PT)    Activity/Assistive Device (Transfer Goal 1, PT) transfers, all; walker, rolling (P)   -     [place] Level/Cues Needed (Transfer Goal 1, PT) contact guard assist (P)   -SH     Time Frame (Transfer Goal 1, PT) 10 days (P)   -[facility] Name 11/09/21 1434          Gait Training Goal 1 (PT)    Activity/Assistive Device (Gait Training Goal 1, PT) gait (walking locomotion); assistive device use; walker, rolling (P)   -     [place] Level (Gait Training Goal 1, PT) contact guard assist (P)   -     Distance (Gait Training Goal 1, PT) 150 feet (P)   -SH     Time Frame (Gait Training Goal 1, PT) 10 days (P)   -[facility] Name 11/09/21 1434          Strength Goal 1 (PT)    Strength Goal 1 (PT) Pt will demonstrate an overall strength grade of at least 4/5 for the right lower extremity. (P)   -     Time Frame (Strength Goal 1, PT) 10 days (P)   -SH     Row Name 11/09/21 1434          Therapy Assessment/Plan (PT)    Rehab Potential (PT) good, to achieve stated therapy goals (P)   -     Criteria for Skilled Interventions Met (PT) skilled treatment is necessary (P)   -     Predicted Duration of Therapy Intervention (PT) 10 days (P)   -     Problem List (PT) problems related to; balance; mobility; range of motion (ROM); strength; pain (P)   -     Activity Limitations Related to Problem List (PT) unable to ambulate safely; unable to transfer safely (P)   -SH     Row Name 11/09/21 1434          PT Evaluation Complexity    History, PT Evaluation Complexity 1-2 personal factors and/or comorbidities (P)   -     Examination of Body Systems (PT Eval Complexity) total of 4 or more elements (P)   -     Clinical Presentation (PT Evaluation Complexity) stable (P)   -     Clinical Decision Making (PT Evaluation Complexity) low complexity (P)   -     Overall Complexity (PT Evaluation Complexity) low complexity (P)   -SH     Row Name 11/09/21 143           Therapy Plan Review/Discharge Plan (PT)    Therapy Plan Review (PT) evaluation/treatment results reviewed; patient (P)   -           User Key  (r) = Recorded By, (t) = Taken By, (c) = Cosigned By    Initials Name Provider Type    AM Leonila Deshpande, RN Registered Nurse     Edwin Munoz, PT Student PT Student              Physical Therapy Education                 Title: PT OT SLP Therapies (Done)     Topic: Physical Therapy (Done)     Point: Mobility training (Done)     Learning Progress Summary           Patient Acceptance, E,TB, VU by  at 11/9/2021 1443                   Point: Home exercise program (Done)     Learning Progress Summary           Patient Acceptance, E,TB, VU by  at 11/9/2021 1443                   Point: Body mechanics (Done)     Learning Progress Summary           Patient Acceptance, E,TB, VU by  at 11/9/2021 1443                   Point: Precautions (Done)     Learning Progress Summary           Patient Acceptance, E,TB, VU by  at 11/9/2021 1443                               User Key     Initials Effective Dates Name Provider Type Discipline     09/06/21 -  Edwin Munoz, PT Student PT Student PT              PT Recommendation and Plan  Anticipated Discharge Disposition (PT): (P) inpatient rehabilitation facility, sub acute care setting  Planned Therapy Interventions (PT): (P) balance training, bed mobility training, gait training, home exercise program, ROM (range of motion), strengthening, transfer training  Therapy Frequency (PT): (P) daily  Plan of Care Reviewed With: (P) patient  Progress: (P) no change  Outcome Summary: (P) Pt presents with deficits in strength, range of motion, transfers, bed mobility, and ambulation. Skilled PT services are recommended in order to address these impairments.   Outcome Measures     Row Name 11/09/21 1442             How much help from another person do you currently need...    Turning from your back to your side while in flat bed without  using bedrails? 2 (P)   -SH      Moving from lying on back to sitting on the side of a flat bed without bedrails? 2 (P)   -SH      Moving to and from a bed to a chair (including a wheelchair)? 2 (P)   -SH      Standing up from a chair using your arms (e.g., wheelchair, bedside chair)? 2 (P)   -SH      Climbing 3-5 steps with a railing? 1 (P)   -SH      To walk in hospital room? 2 (P)   -SH      AM-PAC 6 Clicks Score (PT) 11 (P)   -SH              Functional Assessment    Outcome Measure Options AM-PAC 6 Clicks Basic Mobility (PT) (P)   -SH            User Key  (r) = Recorded By, (t) = Taken By, (c) = Cosigned By    Initials Name Provider Type    Edwin Javier, PT Student PT Student                 Time Calculation:    PT Charges     Row Name 11/09/21 1433             Time Calculation    PT Received On 11/09/21 (P)   -SH      PT Goal Re-Cert Due Date 11/18/21 (P)   -SH              Untimed Charges    PT Eval/Re-eval Minutes 25 (P)   -SH              Total Minutes    Untimed Charges Total Minutes 25 (P)   -SH       Total Minutes 25 (P)   -SH            User Key  (r) = Recorded By, (t) = Taken By, (c) = Cosigned By    Initials Name Provider Type    Edwin Javier, PT Student PT Student              Therapy Charges for Today     Code Description Service Date Service Provider Modifiers Qty    [id number] HC PT EVAL LOW COMPLEXITY 2 11/9/2021 Edwin Munoz, PT Student GP 1          PT G-Codes  Outcome Measure Options: (P) AM-PAC 6 Clicks Basic Mobility (PT)  AM-PAC 6 Clicks Score (PT): (P) 11    Edwin Munoz PT Student  11/9/2021

## 2021-11-09 NOTE — PLAN OF CARE
Goal Outcome Evaluation:  Plan of Care Reviewed With: (P) patient        Progress: (P) no change  Outcome Summary: (P) Pt presents with deficits in strength, range of motion, transfers, bed mobility, and ambulation. Skilled PT services are recommended in order to address these impairments.

## 2021-11-09 NOTE — PLAN OF CARE
Goal Outcome Evaluation:  Plan of Care Reviewed With: patient        Progress: improving  Outcome Summary: Patient alert, able to voice needs and wants, VSS, fluids stopped, son at bedside, tolerated breakfast/AM medications. PRN pain med provided.

## 2021-11-10 ENCOUNTER — APPOINTMENT (OUTPATIENT)
Dept: GENERAL RADIOLOGY | Facility: HOSPITAL | Age: 81
End: 2021-11-10

## 2021-11-10 LAB
BASOPHILS # BLD AUTO: 0.01 10*3/MM3 (ref 0–0.2)
BASOPHILS NFR BLD AUTO: 0.1 % (ref 0–1.5)
DEPRECATED RDW RBC AUTO: 47 FL (ref 37–54)
EOSINOPHIL # BLD AUTO: 0.02 10*3/MM3 (ref 0–0.4)
EOSINOPHIL NFR BLD AUTO: 0.2 % (ref 0.3–6.2)
ERYTHROCYTE [DISTWIDTH] IN BLOOD BY AUTOMATED COUNT: 13.2 % (ref 12.3–15.4)
HCT VFR BLD AUTO: 28.4 % (ref 37.5–51)
HGB BLD-MCNC: 9.2 G/DL (ref 13–17.7)
IMM GRANULOCYTES # BLD AUTO: 0.04 10*3/MM3 (ref 0–0.05)
IMM GRANULOCYTES NFR BLD AUTO: 0.4 % (ref 0–0.5)
LYMPHOCYTES # BLD AUTO: 1 10*3/MM3 (ref 0.7–3.1)
LYMPHOCYTES NFR BLD AUTO: 10.5 % (ref 19.6–45.3)
MCH RBC QN AUTO: 31.5 PG (ref 26.6–33)
MCHC RBC AUTO-ENTMCNC: 32.4 G/DL (ref 31.5–35.7)
MCV RBC AUTO: 97.3 FL (ref 79–97)
MONOCYTES # BLD AUTO: 1.03 10*3/MM3 (ref 0.1–0.9)
MONOCYTES NFR BLD AUTO: 10.8 % (ref 5–12)
NEUTROPHILS NFR BLD AUTO: 7.45 10*3/MM3 (ref 1.7–7)
NEUTROPHILS NFR BLD AUTO: 78 % (ref 42.7–76)
NRBC BLD AUTO-RTO: 0 /100 WBC (ref 0–0.2)
NT-PROBNP SERPL-MCNC: 245.2 PG/ML (ref 0–1800)
PLATELET # BLD AUTO: 151 10*3/MM3 (ref 140–450)
PMV BLD AUTO: 11.7 FL (ref 6–12)
RBC # BLD AUTO: 2.92 10*6/MM3 (ref 4.14–5.8)
WBC # BLD AUTO: 9.55 10*3/MM3 (ref 3.4–10.8)

## 2021-11-10 PROCEDURE — 97110 THERAPEUTIC EXERCISES: CPT

## 2021-11-10 PROCEDURE — 97530 THERAPEUTIC ACTIVITIES: CPT

## 2021-11-10 PROCEDURE — 92610 EVALUATE SWALLOWING FUNCTION: CPT

## 2021-11-10 PROCEDURE — 25010000002 ENOXAPARIN PER 10 MG: Performed by: ORTHOPAEDIC SURGERY

## 2021-11-10 PROCEDURE — 25010000002 FUROSEMIDE PER 20 MG: Performed by: INTERNAL MEDICINE

## 2021-11-10 PROCEDURE — 71045 X-RAY EXAM CHEST 1 VIEW: CPT

## 2021-11-10 PROCEDURE — 83880 ASSAY OF NATRIURETIC PEPTIDE: CPT | Performed by: INTERNAL MEDICINE

## 2021-11-10 PROCEDURE — 99233 SBSQ HOSP IP/OBS HIGH 50: CPT | Performed by: INTERNAL MEDICINE

## 2021-11-10 PROCEDURE — 97165 OT EVAL LOW COMPLEX 30 MIN: CPT

## 2021-11-10 PROCEDURE — 85025 COMPLETE CBC W/AUTO DIFF WBC: CPT | Performed by: INTERNAL MEDICINE

## 2021-11-10 RX ORDER — IPRATROPIUM BROMIDE AND ALBUTEROL SULFATE 2.5; .5 MG/3ML; MG/3ML
3 SOLUTION RESPIRATORY (INHALATION) EVERY 6 HOURS PRN
Status: DISCONTINUED | OUTPATIENT
Start: 2021-11-10 | End: 2021-11-12 | Stop reason: HOSPADM

## 2021-11-10 RX ORDER — FUROSEMIDE 10 MG/ML
20 INJECTION INTRAMUSCULAR; INTRAVENOUS ONCE
Status: COMPLETED | OUTPATIENT
Start: 2021-11-10 | End: 2021-11-10

## 2021-11-10 RX ORDER — BENZONATATE 100 MG/1
100 CAPSULE ORAL 3 TIMES DAILY
Status: DISCONTINUED | OUTPATIENT
Start: 2021-11-10 | End: 2021-11-12 | Stop reason: HOSPADM

## 2021-11-10 RX ADMIN — BENZONATATE 100 MG: 100 CAPSULE ORAL at 20:14

## 2021-11-10 RX ADMIN — SODIUM CHLORIDE, PRESERVATIVE FREE 10 ML: 5 INJECTION INTRAVENOUS at 08:45

## 2021-11-10 RX ADMIN — DOCUSATE SODIUM 50MG AND SENNOSIDES 8.6MG 2 TABLET: 8.6; 5 TABLET, FILM COATED ORAL at 08:43

## 2021-11-10 RX ADMIN — SODIUM CHLORIDE, PRESERVATIVE FREE 10 ML: 5 INJECTION INTRAVENOUS at 21:00

## 2021-11-10 RX ADMIN — BENZONATATE 100 MG: 100 CAPSULE ORAL at 11:15

## 2021-11-10 RX ADMIN — DOCUSATE SODIUM 50MG AND SENNOSIDES 8.6MG 2 TABLET: 8.6; 5 TABLET, FILM COATED ORAL at 20:14

## 2021-11-10 RX ADMIN — HYDROCODONE BITARTRATE AND ACETAMINOPHEN 2 TABLET: 7.5; 325 TABLET ORAL at 20:14

## 2021-11-10 RX ADMIN — ENOXAPARIN SODIUM 40 MG: 40 INJECTION SUBCUTANEOUS at 08:44

## 2021-11-10 RX ADMIN — BENZONATATE 100 MG: 100 CAPSULE ORAL at 16:02

## 2021-11-10 RX ADMIN — HYDROCODONE BITARTRATE AND ACETAMINOPHEN 2 TABLET: 7.5; 325 TABLET ORAL at 06:11

## 2021-11-10 RX ADMIN — HYDROCODONE BITARTRATE AND ACETAMINOPHEN 2 TABLET: 7.5; 325 TABLET ORAL at 13:47

## 2021-11-10 RX ADMIN — FUROSEMIDE 20 MG: 10 INJECTION, SOLUTION INTRAMUSCULAR; INTRAVENOUS at 11:15

## 2021-11-10 RX ADMIN — ATORVASTATIN CALCIUM 10 MG: 10 TABLET, FILM COATED ORAL at 08:43

## 2021-11-10 NOTE — PLAN OF CARE
Goal Outcome Evaluation:  Plan of Care Reviewed With: patient        Progress: improving  Outcome Summary: Patient alert and oriented, able to voice needs and wants, VSS, continues gaining strength and mobility with PT/OT. family present in care of patient, mon[place] in place/draining/patent

## 2021-11-10 NOTE — PROGRESS NOTES
[facility]   Hospitalist Progress Note  Date: 11/10/2021  Patient Name: Sukhwinder Marrero  : 1940  MRN: [id number]  Date of admission: 2021      Subjective   Subjective     Chief Complaint:   Mechanical fall resulting in right hip pain    Summary:   Sukhwinder Marrero is a 82 y/o male with a h/o HTN, hyperlipidemia who presents after a mechanical fall. States he was outside lifting a chair when his feet got tangled and he fell on his right side. Did not hit head or lose consciousness. Son states he has been getting progressively more unsteady on his feet lately and had another fall in his yard recently. He denies any complaints other than pain in his right hip. He denies any cardiac or pulmonary issues. Never has chest pain or sob. Is fairly active on a normal day.   Patient had gamma nailing of right hip fracture by Dr. Nevarez on .  CT chest noted.  No pulmonary symptoms.    Interval Followup:   Patient oxygen saturation anywhere from 85 to 90% on room air.  Pain in right hip is better.  Does complain of cough.  Denies any choking with swallowing.  Does have difficulty swallowing for long time  No BM yet.  Has been passing gas  Patient hard of hearing    Review of Systems   All systems were reviewed and negative except for: Patient states if he does not move his right hip pain is controlled    Objective   Objective     Vitals:   Temp:  [97.5 °F (36.4 °C)-97.9 °F (36.6 °C)] 97.6 °F (36.4 °C)  Heart Rate:  [] 100  Resp:  [16-18] 17  BP: (112-132)/(61-74) 114/61  Flow (L/min):  [2] 2  Physical Exam   Gen: NAD, conversant, alert and oriented x3  Eyes: conjunctiva clear, moist mucous membranes  HENT: Atraumatic, oropharynx clear with moist mucous membranes  Neck: No lymphadenopathy, no thyromegaly  Resp: normal respiratory effort, CTAB, no crackles  CV: RRR, no MRGs, no peripheral edema  GI: Soft, nontender, nondistended, (+) BS.  M/S: Patient's right lower extremity shortened and  externally rotated, neurovascularly distally intact  Neuro: normal motor function in 3 ext, limited range of motion due to pain, CN 2-12 intact    Result Review    Result Review:  I have personally reviewed the results from the time of this admission to 11/10/2021 16:34 EST and agree with these findings:  [x]  Laboratory  []  Microbiology  [x]  Radiology  [x]  EKG/Telemetry   [x]  Cardiology/Vascular   []  Pathology  [x]  Old records  [x]  Other: Medications    Assessment/Plan   Assessment / Plan     Assessment/Plan:  Right intertrochanteric femur fracture.  S/p gamma nailing by Dr. Nevarez on November 9  Mechanical ground-level fall causing above  Bibasilar opacities right more than left on CT scan.  Asymptomatic.  Question aspiration  Hyperkalemia.  Resolved  NAEL.  Resolved  Acute anemia.  Likely postop blood loss and due to IV hydration  Hypertension  Hyperlipidemia  Acute hypoxic respiratory failure.  Patient does not use oxygen at home.  Moderately large hiatal hernia with fluid-filled esophagus indicating esophageal dysmotility.    Plan:  Patient admitted to the hospital for further care and management  Orthopedic surgery has been consulted, appreciate assistance  Bowel regimen  CT of chest reviewed, patient with no complaints of shortness of breath nor aspirations.  Repeat chest x-ray with worsening.  Speech therapy consulted.  Nebulizer treatment as needed.  Incentive spirometry.  Tessalon for cough  IV and oral narcotics for pain control   no baseline creatinine has been found for patient however has improved since admission  Patient's hyperkalemia has resolved, continue to monitor  PT OT  Discussed with  and RN.  Patient's wife changed her mind now and wants him to go to rehab.      DVT prophylaxis:  Medical and mechanical DVT prophylaxis orders are present.    CODE STATUS:              Electronically signed by Kostas Roach MD, 11/10/21, 4:37 PM EST.

## 2021-11-10 NOTE — THERAPY EVALUATION
Acute Care - Speech Language Pathology   Swallow Initial Evaluation  [place]     Patient Name: Sukhwinder Marrero  : 1940  MRN: [id number]  Today's Date: 11/10/2021               Admit Date: 2021    Visit Dx:     ICD-10-CM ICD-9-CM   1. Closed fracture of right hip, initial encounter ([facility])  S72.001A 820.8   2. Acute renal failure, unspecified acute renal failure type (HCC)  N17.9 584.9   3. Difficulty in walking  R26.2 719.7   4. Dysphagia, oropharyngeal  R13.12 787.22     Patient Active Problem List   Diagnosis   • Closed fracture of right hip (HCC)     Past Medical History:   Diagnosis Date   • Hyperlipidemia    • Hypertension      Past Surgical History:   Procedure Laterality Date   • FEMUR IM NAILING/RODDING Right 2021    Procedure: FEMUR INTRAMEDULLARY NAILING/RODDING;  Surgeon: Aristides Nevarez MD;  Location: [facility] MAIN OR;  Service: Orthopedics;  Laterality: Right;     PAIN SCALE: None reported    PRECAUTIONS/CONTRAINDICATIONS: None noted    SUSPECTED ABUSE/NEGLECT/EXPLOITATION: None noted    SOCIAL/PSYCHOLOGICAL NEEDS/BARRIERS: None noted    PAST SOCIAL HISTORY: Independent    PRIOR FUNCTION: Independent    PATIENT GOALS/EXPECTATIONS: Return home    HISTORY: This patient is an 81-year-old white male admitted after sustaining a fall at home resulting in a hip fracture.  Patient reports some difficulty swallowing since admitted to the hospital with report of retrosternal sensation of food sticking.  Reports dry mouth.  Patient denies difficulty with liquids. CXR shows worsening CHF.  CT chest on  also shows bilateral infiltrates, also reported distended esophagus containing fluid and gas.      CURRENT DIET LEVEL: Regular diet    OBJECTIVE:    TEST ADMINISTERED: Clinical dysphagia evaluation    COGNITION/SAFETY AWARENESS: Alert and oriented    BEHAVIORAL OBSERVATIONS: Pleasant cooperative    ORAL MOTOR EXAM: Lingual and labial strength and range of motion within functional  limits    VOICE QUALITY: Mild hoarseness    REFLEX EXAM: Deferred    POSTURE: 90 degrees upright    FEEDING/SWALLOWING FUNCTION: Assessed with thin liquids from spoon cup and straw, purée consistencies soft and hard solids.    CLINICAL OBSERVATIONS: Oral stage is characterized by prolonged bolus preparation and mastication with regular solids.  Appears to have timely oral transit.  Pharyngeal phase appears timely with good laryngeal elevation per palpation.  Swallow completed with thin liquids without clinical sign or symptom of aspiration with a spoon and cup trials.  Mild wet voicing after the swallow with sequential straw trials only.  Tolerates purée consistencies without clinical sign or symptom of aspiration.  Patient reporting retrosternal sensation of solids sticking with completion of swallow with soft and hard solids.  Liquid wash appeared to be effective in eliminating sensation.    DYSPHAGIA CRITERIA: Risk of aspiration    FUNCTIONAL ASSESSMENT INSTRUMENT: Patient currently scored a level 6 of 7 on Functional Communication Measures for swallowing indicating a 1-19% limitation in function.    ASSESSMENT/ PLAN OF CARE:  Pt presents with limitations, noted below, that impede his ability to swallow safely. The skills of a therapist will be required to safely and effectively implement the following treatment plan to restore maximal level of function.    PROBLEMS:  1.  Dysphagia   LTG 1: (30 days) patient will increase ability to tolerate least restrictive diet and improve functional communication measure for swallowing to a 7 of 7   STG 1a: (14 days) patient will tolerate mechanical soft diet and thin liquids without clinical sign or symptom of aspiration   STG 1b: (14 days) patient/family teaching regarding diet recommendations, safe swallow strategies and signs and symptoms of aspiration.   STG 1c: (14 days) patient will participate modified barium swallow study to rule out aspiration      TREATMENT:  Dysphagia therapy to ensure diet tolerance, advance to least restrictive diet and analyze for aspiration risk.    FREQUENCY/DURATION: Daily 5 times a week    REHAB POTENTIAL:  Pt has good rehab potential.  The following limitations may influence improvement/ length of tx medical status.    RECOMMENDATIONS:   1.   DIET: Mechanical soft diet-extra sauce/gravy with each tray, single sips of thin liquids    2.  POSITION: 90 degrees upright during and for 30 minutes after meal    3.  COMPENSATORY STRATEGIES: Strict reflux precautions, alternate solids/liquids, patient is reporting dry mouth, encouraged to begin meals with a sip of liquid and alternate throughout.      Given patient's report of retrosternal sensation of solids sticking patient may benefit from GI consult.  Request order for modified barium swallow to rule out silent aspiration.    Pt/responsible party agrees with plan of care and has been informed of all alternatives, risks and benefits.    EDUCATION  The patient has been educated in the following areas:   Dysphagia (Swallowing Impairment).     Vickie Weinstein, SLP  11/10/2021

## 2021-11-10 NOTE — PROGRESS NOTES
[facility]     Progress Note    Patient Name: Sukhwinder Marrero  : 1940  MRN: [id number]  Primary Care Physician:  Provider, No Known  Date of admission: 2021    Subjective   Subjective     HPI:  Patient Reports doing pretty well this morning.  He is not sleeping well but his pain is controlled.  No new complaints.    Review of Systems   See HPI    Objective   Objective     Vitals:   Temp:  [97.5 °F (36.4 °C)-97.9 °F (36.6 °C)] 97.9 °F (36.6 °C)  Heart Rate:  [83-95] 86  Resp:  [16-18] 18  BP: (103-127)/(50-74) 127/69  Flow (L/min):  [2] 2  Physical Exam    General: Alert, no acute distress   Chest: Unlabored breathing   Cardiovascular: Regular heart rate   Musculoskeletal: Dressing intact right hip.  Positive pulses.  Neurovascular intact to extremity.  Mild swelling, compartments soft and compressible.  Negative Ayse.    Result Review      WBC   Date Value Ref Range Status   11/10/2021 9.55 3.40 - 10.80 10*3/mm3 Final     RBC   Date Value Ref Range Status   11/10/2021 2.92 (L) 4.14 - 5.80 10*6/mm3 Final     Hemoglobin   Date Value Ref Range Status   11/10/2021 9.2 (L) 13.0 - 17.7 g/dL Final     Hematocrit   Date Value Ref Range Status   11/10/2021 28.4 (L) 37.5 - 51.0 % Final     MCV   Date Value Ref Range Status   11/10/2021 97.3 (H) 79.0 - 97.0 fL Final     MCH   Date Value Ref Range Status   11/10/2021 31.5 26.6 - 33.0 pg Final     MCHC   Date Value Ref Range Status   11/10/2021 32.4 31.5 - 35.7 g/dL Final     RDW   Date Value Ref Range Status   11/10/2021 13.2 12.3 - 15.4 % Final     RDW-SD   Date Value Ref Range Status   11/10/2021 47.0 37.0 - 54.0 fl Final     MPV   Date Value Ref Range Status   11/10/2021 11.7 6.0 - 12.0 fL Final     Platelets   Date Value Ref Range Status   11/10/2021 151 140 - 450 10*3/mm3 Final     Neutrophil %   Date Value Ref Range Status   11/10/2021 78.0 (H) 42.7 - 76.0 % Final     Lymphocyte %   Date Value Ref Range Status   11/10/2021 10.5 (L) 19.6 - 45.3 % Final      Monocyte %   Date Value Ref Range Status   11/10/2021 10.8 5.0 - 12.0 % Final     Eosinophil %   Date Value Ref Range Status   11/10/2021 0.2 (L) 0.3 - 6.2 % Final     Basophil %   Date Value Ref Range Status   11/10/2021 0.1 0.0 - 1.5 % Final     Immature Grans %   Date Value Ref Range Status   11/10/2021 0.4 0.0 - 0.5 % Final     Neutrophils, Absolute   Date Value Ref Range Status   11/10/2021 7.45 (H) 1.70 - 7.00 10*3/mm3 Final     Lymphocytes, Absolute   Date Value Ref Range Status   11/10/2021 1.00 0.70 - 3.10 10*3/mm3 Final     Monocytes, Absolute   Date Value Ref Range Status   11/10/2021 1.03 (H) 0.10 - 0.90 10*3/mm3 Final     Eosinophils, Absolute   Date Value Ref Range Status   11/10/2021 0.02 0.00 - 0.40 10*3/mm3 Final     Basophils, Absolute   Date Value Ref Range Status   11/10/2021 0.01 0.00 - 0.20 10*3/mm3 Final     Immature Grans, Absolute   Date Value Ref Range Status   11/10/2021 0.04 0.00 - 0.05 10*3/mm3 Final     nRBC   Date Value Ref Range Status   11/10/2021 0.0 0.0 - 0.2 /100 WBC Final        Result Review:  I have personally reviewed the results from the time of this admission to 11/10/2021 07:39 EST and agree with these findings:  [x]  Laboratory  []  Microbiology  []  Radiology  []  EKG/Telemetry   []  Cardiology/Vascular   []  Pathology  []  Old records  []  Other:      Assessment/Plan   Assessment / Plan     Brief Patient Summary:  Sukhwinder Marrero is a 81 y.o. male who is postoperative day #2 status post right hip cephalomedullary nail  Active Hospital Problems:  Active Hospital Problems    Diagnosis    • Closed fracture of right hip (HCC)      Plan: Weightbearing as tolerated.  Walker  with ambulation.  Physical and Occupational Therapy.  Monitor hemoglobin/hematocrit.  Pain control.  DVT prophylaxis.  Plan for discharge when medically able.  Follow-up with me 2 weeks postoperatively.       DVT prophylaxis:  Medical and mechanical DVT prophylaxis orders are present.    CODE STATUS:       Disposition:  I expect patient to be discharged when medically able.    Electronically signed by Aristides Nevarez MD, 11/10/21, 7:39 AM EST.

## 2021-11-10 NOTE — PLAN OF CARE
Goal Outcome Evaluation:  Plan of Care Reviewed With: patient        Progress: no change (initial evaluation)  Outcome Summary: Patient experienced fall in parking lot, resulting in closed R hip fx. Patient is WBAT RLE. Patient has experienced decline in function from baseline status, presenting w/ deficits related to balance, activity tolerance, ADL task completion, ADL transfers, and UE strength.  Patient would benefit from skilled OT intervention to maxamize patient safety, prevent further debility and promote return to optimal level of independence w/ ADL transfers and task completion.

## 2021-11-10 NOTE — PLAN OF CARE
Goal Outcome Evaluation:      FUNCTIONAL ASSESSMENT INSTRUMENT: Patient currently scored a level 6 of 7 on Functional Communication Measures for swallowing indicating a 1-19% limitation in function.     ASSESSMENT/ PLAN OF CARE:  Pt presents with limitations, noted below, that impede his ability to swallow safely. The skills of a therapist will be required to safely and effectively implement the following treatment plan to restore maximal level of function.     PROBLEMS:  1.  Dysphagia               TREATMENT: Dysphagia therapy to ensure diet tolerance, advance to least restrictive diet and analyze for aspiration risk.     FREQUENCY/DURATION: Daily 5 times a week     REHAB POTENTIAL:  Pt has good rehab potential.  The following limitations may influence improvement/ length of tx medical status.     RECOMMENDATIONS:   1.   DIET: Mechanical soft diet-extra sauce/gravy with each tray, single sips of thin liquids     2.  POSITION: 90 degrees upright during and for 30 minutes after meal     3.  COMPENSATORY STRATEGIES: Strict reflux precautions, alternate solids/liquids, patient is reporting dry mouth, encouraged to begin meals with a sip of liquid and alternate throughout.       Given patient's report of retrosternal sensation of solids sticking patient may benefit from GI consult.  Request order for modified barium swallow to rule out silent aspiration.     Pt/responsible party agrees with plan of care and has been informed of all alternatives, risks and benefits.           EDUCATION  The patient has been educated in the following areas:   Dysphagia (Swallowing Impairment).

## 2021-11-10 NOTE — THERAPY TREATMENT NOTE
Acute Care - Physical Therapy Treatment Note   [place]     Patient Name: Sukhwinder Marrero  : 1940  MRN: [id number]  Today's Date: 11/10/2021      Visit Dx:     ICD-10-CM ICD-9-CM   1. Closed fracture of right hip, initial encounter ([facility])  S72.001A 820.8   2. Acute renal failure, unspecified acute renal failure type (HCC)  N17.9 584.9   3. Difficulty in walking  R26.2 719.7     Patient Active Problem List   Diagnosis   • Closed fracture of right hip (HCC)     Past Medical History:   Diagnosis Date   • Hyperlipidemia    • Hypertension      Past Surgical History:   Procedure Laterality Date   • FEMUR IM NAILING/RODDING Right 2021    Procedure: FEMUR INTRAMEDULLARY NAILING/RODDING;  Surgeon: Aristides Nevarez MD;  Location: [facility] OR;  Service: Orthopedics;  Laterality: Right;     PT Assessment (last 12 hours)     PT Evaluation and Treatment     Row Name 11/10/21 1232          Physical Therapy Time and Intention    Subjective Information complains of; weakness; fatigue; dizziness; nausea/vomiting; pain  -RH     Document Type therapy note (daily note)  -     Mode of Treatment physical therapy; individual therapy  -RH     Patient Effort fair  -     Row Name 11/10/21 1232          Pain Scale: Word Pre/Post-Treatment    Pain: Word Scale, Pretreatment 2 - mild pain  -RH     Posttreatment Pain Rating 2 - mild pain  -RH     Pain Location - Side Right  -RH     Pain Location hip  -RH     Pain Intervention(s) Emotional support  -     Row Name 11/10/21 1232          Range of Motion (ROM)    Range of Motion --  Pt R hip AAROM at 60 degrees flex and 15 degrees abd.  -     Row Name 11/10/21 1232          Strength (Manual Muscle Testing)    Strength (Manual Muscle Testing) --  Pt R hip flex strength at 2/5.  -     Row Name 11/10/21 1232          Mobility    Extremity Weight-bearing Status left lower extremity  -RH     Right Lower Extremity (Weight-bearing Status) weight-bearing as tolerated (WBAT)  -      Row Name 11/10/21 1232          Bed Mobility    Bed Mobility supine-sit  -RH     All Activities, [place] (Bed Mobility) minimum assist (75% patient effort); moderate assist (50% patient effort)  -     Supine-Sit [place] (Bed Mobility) minimum assist (75% patient effort); moderate assist (50% patient effort)  -     Assistive Device (Bed Mobility) bed rails  -RH     Comment (Bed Mobility) --  Pt maintains sitting at side of bed with CGA.  -     Row Name 11/10/21 1232          Transfers    Transfers sit-stand transfer; stand-sit transfer  -     Maintains Weight-bearing Status (Transfers) able to maintain  -     Sit-Stand [place] (Transfers) minimum assist (75% patient effort); moderate assist (50% patient effort)  -     Stand-Sit [place] (Transfers) minimum assist (75% patient effort); moderate assist (50% patient effort)  -     Row Name 11/10/21 1232          Sit-Stand Transfer    Assistive Device (Sit-Stand Transfers) walker, front-wheeled  -     Row Name 11/10/21 1232          Stand-Sit Transfer    Assistive Device (Stand-Sit Transfers) walker, front-wheeled  -     Row Name 11/10/21 1232          Gait/Stairs (Locomotion)    Gait/Stairs Locomotion gait/ambulation independence; gait/ambulation assistive device; distance ambulated; gait pattern; gait deviations; maintains weight-bearing status  -     [place] Level (Gait) moderate assist (50% patient effort)  -     Assistive Device (Gait) walker, front-wheeled  -     Distance in Feet (Gait) 4  -RH     Pattern (Gait) 3-point; step-to  -RH     Deviations/Abnormal Patterns (Gait) base of support, narrow; nawaf decreased; festinating/shuffling; gait speed decreased; stride length decreased; right sided deviations  -     Right Sided Gait Deviations knee buckling, right side; weight shift ability decreased  -     Maintains Weight-bearing Status (Gait) able to maintain  -     Row Name             Wound 11/08/21 0041  Right anterior Incision    Wound - Properties Group Placement Date: 11/08/21  -AM Placement Time: 1635  -AM Side: Right  -AM Orientation: anterior  -AM Primary Wound Type: Incision  -AM, X3      Retired Wound - Properties Group Date first assessed: 11/08/21  -AM Time first assessed: 1635  -AM Side: Right  -AM Primary Wound Type: Incision  -AM, X3      Row Name 11/10/21 1232          Vital Signs    Intra SpO2 (%) 74  -RH     O2 Delivery Intra Treatment room air  -RH     Row Name 11/10/21 1232          Progress Summary (PT)    Progress Toward Functional Goals (PT) progress toward functional goals as expected  -RH           User Key  (r) = Recorded By, (t) = Taken By, (c) = Cosigned By    Initials Name Provider Type    AM Leonila Deshpande, RN Registered Nurse    RH Waqas Mcnair PTA Physical Therapy Assistant            Right Lower Extremity   Exercise  Reps  Sets    Short arc quads   10 2   Heel slides  10 2   Ankle pumps  10 2   Quad sets  10 2        Hip ab/adduction 10 2        Physical Therapy Education                 Title: PT OT SLP Therapies (Done)     Topic: Physical Therapy (Done)     Point: Mobility training (Done)     Learning Progress Summary           Patient Acceptance, E,TB, VU by  at 11/9/2021 1443                   Point: Home exercise program (Done)     Learning Progress Summary           Patient Acceptance, E,TB, VU by  at 11/9/2021 1443                   Point: Body mechanics (Done)     Learning Progress Summary           Patient Acceptance, E,TB, VU by  at 11/9/2021 1443                   Point: Precautions (Done)     Learning Progress Summary           Patient Acceptance, E,TB, VU by  at 11/9/2021 1443                               User Key     Initials Effective Dates Name Provider Type Discipline     09/06/21 -  Edwin Munoz, PT Student PT Student PT              PT Recommendation and Plan     Progress Summary (PT)  Progress Toward Functional Goals (PT): progress toward functional  goals as expected   Outcome Measures     Row Name 11/10/21 1200 11/09/21 1442          How much help from another person do you currently need...    Turning from your back to your side while in flat bed without using bedrails? 2  -RH 2  -CARMELO (r) SH (t) CARMELO (c)     Moving from lying on back to sitting on the side of a flat bed without bedrails? 2  -RH 2  -CARMELO (r) SH (t) CARMELO (c)     Moving to and from a bed to a chair (including a wheelchair)? 2  -RH 2  -CARMELO (r) SH (t) CARMELO (c)     Standing up from a chair using your arms (e.g., wheelchair, bedside chair)? 1  -RH 2  -CARMELO (r) SH (t) CARMELO (c)     Climbing 3-5 steps with a railing? 2  -RH 1  -CARMELO (r) SH (t) CARMELO (c)     To walk in hospital room? 2  -RH 2  -CARMELO (r) SH (t) CARMELO (c)     AM-PAC 6 Clicks Score (PT) 11  -RH 11  -CARMELO (r) SH (t)            Functional Assessment    Outcome Measure Options -- AM-PAC 6 Clicks Basic Mobility (PT)  -CARMELO (r) SH (t) CARMELO (c)           User Key  (r) = Recorded By, (t) = Taken By, (c) = Cosigned By    Initials Name Provider Type    RH Waqas Mcnair PTA Physical Therapy Assistant    Buddy Sanchez, PT Physical Therapist     Edwin Munoz, PT Student PT Student                 Time Calculation:    PT Charges     Row Name 11/10/21 1230             Time Calculation    PT Received On 11/10/21  -RH              Timed Charges    75559 - PT Therapeutic Exercise Minutes 24  -RH      56403 - Gait Training Minutes  4  -RH      02576 - PT Therapeutic Activity Minutes 10  -RH              Total Minutes    Timed Charges Total Minutes 38  -RH       Total Minutes 38  -RH            User Key  (r) = Recorded By, (t) = Taken By, (c) = Cosigned By    Initials Name Provider Type     Waqas Mcnair PTA Physical Therapy Assistant              Therapy Charges for Today     Code Description Service Date Service Provider Modifiers Qty    [id number]  PT THER PROC EA 15 MIN 11/10/2021 Waqas Mcnair PTA GP 2    [id number]  PT THERAPEUTIC ACT EA 15 MIN 11/10/2021  Waqas Mcnair, PTA GP 1          PT G-Codes  Outcome Measure Options: AM-PAC 6 Clicks Basic Mobility (PT)  AM-PAC 6 Clicks Score (PT): 11    Waqas Mcnair PTA  11/10/2021

## 2021-11-10 NOTE — THERAPY EVALUATION
Patient Name: Sukhwinder Marrero  : 1940    MRN: [id number]                              Today's Date: 11/10/2021       Admit Date: 2021    Visit Dx:     ICD-10-CM ICD-9-CM   1. Closed fracture of right hip, initial encounter (HCC)  S72.001A 820.8   2. Acute renal failure, unspecified acute renal failure type (HCC)  N17.9 584.9   3. Difficulty in walking  R26.2 719.7   4. Dysphagia, oropharyngeal  R13.12 787.22   5. Decreased activities of daily living (ADL)  Z78.9 V49.89     Patient Active Problem List   Diagnosis   • Closed fracture of right hip (HCC)     Past Medical History:   Diagnosis Date   • Hyperlipidemia    • Hypertension      Past Surgical History:   Procedure Laterality Date   • FEMUR IM NAILING/RODDING Right 2021    Procedure: FEMUR INTRAMEDULLARY NAILING/RODDING;  Surgeon: Aristides Nevarez MD;  Location: [facility];  Service: Orthopedics;  Laterality: Right;      General Information     Row Name 11/10/21 1429          OT Time and Intention    Document Type evaluation  -ES     Mode of Treatment individual therapy; occupational therapy  -ES     Row Name 11/10/21 1429          General Information    Patient Profile Reviewed yes  -ES     Prior Level of Function independent:  Patient reports independence at baseline w/ B and IADL task completion. Reports cane for functional mobility in apartment, walking stick out of the home. Tub/shower combo w/ bench, patient stands to shower. No home O2. Patient drives, cook, cleans.  -ES     Existing Precautions/Restrictions fall  -ES     Barriers to Rehab none identified  -ES     Row Name 11/10/21 1429          Occupational Profile    Reason for Services/Referral (Occupational Profile) Patient is 81 yr old male admitted to [facility] on 2021 after experiencing a fall in parking lot resulting in R closed hip fx. Patient is WBAT RLE. OT evaluation and treatment ordered d/t recent decline in ADLs/transfer ability. No previous OT  services for current condition.  -     Row Name 11/10/21 1429          Living Environment    Lives With spouse  -     Row Name 11/10/21 1429          Home Main Entrance    Number of Stairs, Main Entrance none  -     Row Name 11/10/21 1429          Stairs Within Home, Primary    Number of Stairs, Within Home, Primary none  -ES     Stairs Comment, Within Home, Primary Patient reports he and his spouse live in single bedroom apartment, no stairs to enter or in home  -     Row Name 11/10/21 1429          Cognition    Orientation Status (Cognition) oriented to; person; place; other (see comments)  Patient oriented to person, place, however reports it is 1923. Reorientation provided.  -     Row Name 11/10/21 1429          Safety Issues, Functional Mobility    Safety Issues Affecting Function (Mobility) safety precaution awareness  -ES     Impairments Affecting Function (Mobility) balance; endurance/activity tolerance; pain; strength  -ES           User Key  (r) = Recorded By, (t) = Taken By, (c) = Cosigned By    Initials Name Provider Type    ES Kalie Hensley OT Occupational Therapist                 Mobility/ADL's     Row Name 11/10/21 1433          Bed Mobility    Comment (Bed Mobility) Patient met seated in recliner at therapy arrival  -     Row Name 11/10/21 1433          Transfers    Comment (Transfers) Patient declines transfers secondary to RLE pain. Patient agreeable to achieve long sitting in recliner, min A required.  -     Row Name 11/10/21 1433          Activities of Daily Living    BADL Assessment/Intervention --  I with feeding. Min A UB. Max A LB. Max A bathing. S/U grooming at bed level. Mod-max A toileting (bed pan and HH urinal)  -     Row Name 11/10/21 1433          Mobility    Extremity Weight-bearing Status right lower extremity  -ES     Right Lower Extremity (Weight-bearing Status) weight-bearing as tolerated (WBAT)  -ES           User Key  (r) = Recorded By, (t) = Taken By, (c) =  Cosigned By    Initials Name Provider Type    Kalie Woods OT Occupational Therapist               Obj/Interventions     Row Name 11/10/21 1441          Sensory Assessment (Somatosensory)    Sensory Assessment (Somatosensory) UE sensation intact  intact to lt and heavy touch BUEs  -ES     Row Name 11/10/21 1441          Vision Assessment/Intervention    Visual Impairment/Limitations WFL; corrective lenses full-time  -ES     Row Name 11/10/21 1441          Range of Motion Comprehensive    General Range of Motion bilateral upper extremity ROM WNL  -ES     Row Name 11/10/21 1441          Strength Comprehensive (MMT)    General Manual Muscle Testing (MMT) Assessment upper extremity strength deficits identified  -ES     Comment, General Manual Muscle Testing (MMT) Assessment BUE strength grossly 4-/5  -ES     Row Name 11/10/21 1441          Motor Skills    Motor Skills coordination; functional endurance  -ES     Coordination WFL  R handed  -ES     Functional Endurance fair minus  -ES     Row Name 11/10/21 1441          Balance    Balance Assessment sitting dynamic balance  -ES     Dynamic Sitting Balance WFL  -ES           User Key  (r) = Recorded By, (t) = Taken By, (c) = Cosigned By    Initials Name Provider Type    Kalie Woods OT Occupational Therapist               Goals/Plan     Row Name 11/10/21 1446          Bed Mobility Goal 1 (OT)    Activity/Assistive Device (Bed Mobility Goal 1, OT) bed mobility activities, all  -ES     [place] Level/Cues Needed (Bed Mobility Goal 1, OT) modified independence  -ES     Time Frame (Bed Mobility Goal 1, OT) long term goal (LTG); 10 days  -ES     Row Name 11/10/21 1446          Transfer Goal 1 (OT)    Activity/Assistive Device (Transfer Goal 1, OT) transfers, all; walker, rolling  -ES     [place] Level/Cues Needed (Transfer Goal 1, OT) modified independence  -ES     Time Frame (Transfer Goal 1, OT) long term goal (LTG); 10 days  -ES     Row Name 11/10/21 1446           Bathing Goal 1 (OT)    Activity/Device (Bathing Goal 1, OT) bathing skills, all  -ES     [place] Level/Cues Needed (Bathing Goal 1, OT) modified independence  -ES     Time Frame (Bathing Goal 1, OT) long term goal (LTG); 10 days  -ES     Row Name 11/10/21 1446          Dressing Goal 1 (OT)    Activity/Device (Dressing Goal 1, OT) dressing skills, all  -ES     [place]/Cues Needed (Dressing Goal 1, OT) modified independence  -ES     Time Frame (Dressing Goal 1, OT) long term goal (LTG); 10 days  -ES     Row Name 11/10/21 1446          Toileting Goal 1 (OT)    Activity/Device (Toileting Goal 1, OT) toileting skills, all; commode chair  -ES     [place] Level/Cues Needed (Toileting Goal 1, OT) modified independence  -ES     Time Frame (Toileting Goal 1, OT) long term goal (LTG); 10 days  -ES     Row Name 11/10/21 1446          Grooming Goal 1 (OT)    Activity/Device (Grooming Goal 1, OT) grooming skills, all  -ES     [place] (Grooming Goal 1, OT) modified independence  -ES     Time Frame (Grooming Goal 1, OT) long term goal (LTG); 10 days  -ES     Row Name 11/10/21 1446          Strength Goal 1 (OT)    Strength Goal 1 (OT) Pt will increase BUE strength 1/2 muscle grade w/ HEP provided handouts and demo for increased UE strength required for ADL transfers and task completion  -ES     Time Frame (Strength Goal 1, OT) long term goal (LTG); 10 days  -ES     Row Name 11/10/21 1446          Therapy Assessment/Plan (OT)    Planned Therapy Interventions (OT) activity tolerance training; BADL retraining; functional balance retraining; occupation/activity based interventions; patient/caregiver education/training; ROM/therapeutic exercise; strengthening exercise; transfer/mobility retraining  -ES           User Key  (r) = Recorded By, (t) = Taken By, (c) = Cosigned By    Initials Name Provider Type    Kalie Woods OT Occupational Therapist               Clinical Impression     Row Name 11/10/21  1442          Pain Assessment    Additional Documentation Pain Scale: Numbers Pre/Post-Treatment (Group)  -ES     Row Name 11/10/21 1442          Pain Scale: Numbers Pre/Post-Treatment    Pretreatment Pain Rating 3/10  -ES     Posttreatment Pain Rating 3/10  -ES     Pain Location - Side Right  -ES     Pain Location hip  -ES     Pain Intervention(s) Nursing Notified  -ES     Row Name 11/10/21 1442          Plan of Care Review    Plan of Care Reviewed With patient  -ES     Progress no change  initial evaluation  -ES     Outcome Summary Patient experienced fall in parking lot, resulting in closed R hip fx. Patient is WBAT RLE. Patient has experienced decline in function from baseline status, presenting w/ deficits related to balance, activity tolerance, ADL task completion, ADL transfers, and UE strength.  Patient would benefit from skilled OT intervention to maxamize patient safety, prevent further debility and promote return to optimal level of independence w/ ADL transfers and task completion.  -ES     Row Name 11/10/21 1442          Therapy Assessment/Plan (OT)    Rehab Potential (OT) good, to achieve stated therapy goals  -ES     Criteria for Skilled Therapeutic Interventions Met (OT) yes; meets criteria; skilled treatment is necessary  -ES     Therapy Frequency (OT) 5 times/wk  -ES     Row Name 11/10/21 1442          Therapy Plan Review/Discharge Plan (OT)    Equipment Needs Upon Discharge (OT) commode chair  -ES     Anticipated Discharge Disposition (OT) skilled nursing facility; sub acute care setting  -ES     Row Name 11/10/21 1442          Positioning and Restraints    Pre-Treatment Position sitting in chair/recliner  -ES     Post Treatment Position chair  -ES     In Chair reclined; call light within reach; encouraged to call for assist; notified nsg  -ES           User Key  (r) = Recorded By, (t) = Taken By, (c) = Cosigned By    Initials Name Provider Type    Kalie Woods, OT Occupational Therapist                Outcome Measures     Row Name 11/10/21 1447          How much help from another is currently needed...    Putting on and taking off regular lower body clothing? 2  -ES     Bathing (including washing, rinsing, and drying) 2  -ES     Toileting (which includes using toilet bed pan or urinal) 2  -ES     Putting on and taking off regular upper body clothing 3  -ES     Taking care of personal grooming (such as brushing teeth) 3  -ES     Eating meals 4  -ES     AM-PAC 6 Clicks Score (OT) 16  -ES     Row Name 11/10/21 1200          How much help from another person do you currently need...    Turning from your back to your side while in flat bed without using bedrails? 2  -RH     Moving from lying on back to sitting on the side of a flat bed without bedrails? 2  -RH     Moving to and from a bed to a chair (including a wheelchair)? 2  -RH     Standing up from a chair using your arms (e.g., wheelchair, bedside chair)? 1  -RH     Climbing 3-5 steps with a railing? 2  -RH     To walk in hospital room? 2  -RH     AM-PAC 6 Clicks Score (PT) 11  -RH     Row Name 11/10/21 1447          Functional Assessment    Outcome Measure Options AM-PAC 6 Clicks Daily Activity (OT); Optimal Instrument  -ES     Row Name 11/10/21 1447          Optimal Instrument    Optimal Instrument Optimal - 3  -ES     Bending/Stooping 4  -ES     Standing 4  -ES     Reaching 1  -ES     From the list, choose the 3 activities you would most like to be able to do without any difficulty Standing; Bending/stooping; Reaching  -ES     Total Score Optimal - 3 9  -ES           User Key  (r) = Recorded By, (t) = Taken By, (c) = Cosigned By    Initials Name Provider Type    RH Waqas Mcnair, CARLA Physical Therapy Assistant    Kalie Woods OT Occupational Therapist                Occupational Therapy Education                 Title: PT OT SLP Therapies (Done)     Topic: Occupational Therapy (Done)     Point: ADL training (Done)     Description:   Instruct  learner(s) on proper safety adaptation and remediation techniques during self care or transfers.   Instruct in proper use of assistive devices.              Learning Progress Summary           Patient Acceptance, E, VU by  at 11/10/2021 1448                   Point: Home exercise program (Done)     Description:   Instruct learner(s) on appropriate technique for monitoring, assisting and/or progressing therapeutic exercises/activities.              Learning Progress Summary           Patient Acceptance, E, VU by  at 11/10/2021 1448                   Point: Precautions (Done)     Description:   Instruct learner(s) on prescribed precautions during self-care and functional transfers.              Learning Progress Summary           Patient Acceptance, E, VU by  at 11/10/2021 1448                   Point: Body mechanics (Done)     Description:   Instruct learner(s) on proper positioning and spine alignment during self-care, functional mobility activities and/or exercises.              Learning Progress Summary           Patient Acceptance, E, VU by  at 11/10/2021 1448                               User Key     Initials Effective Dates Name Provider Type Discipline     09/13/21 -  Kalie Hensley OT Occupational Therapist OT              OT Recommendation and Plan  Planned Therapy Interventions (OT): activity tolerance training, BADL retraining, functional balance retraining, occupation/activity based interventions, patient/caregiver education/training, ROM/therapeutic exercise, strengthening exercise, transfer/mobility retraining  Therapy Frequency (OT): 5 times/wk  Plan of Care Review  Plan of Care Reviewed With: patient  Progress: no change (initial evaluation)  Outcome Summary: Patient experienced fall in parking lot, resulting in closed R hip fx. Patient is WBAT RLE. Patient has experienced decline in function from baseline status, presenting w/ deficits related to balance, activity tolerance, ADL task  completion, ADL transfers, and UE strength.  Patient would benefit from skilled OT intervention to maxamize patient safety, prevent further debility and promote return to optimal level of independence w/ ADL transfers and task completion.     Time Calculation:    Time Calculation- OT     Row Name 11/10/21 1449 11/10/21 1230          Time Calculation- OT    OT Received On 11/10/21  -ES --     OT Goal Re-Cert Due Date 11/19/21  -ES --            Timed Charges    46201 - Gait Training Minutes  -- 4  -RH            Untimed Charges    OT Eval/Re-eval Minutes 25  -ES --            Total Minutes    Timed Charges Total Minutes -- 4  -RH     Untimed Charges Total Minutes 25  -ES --      Total Minutes 25  -ES 4  -RH           User Key  (r) = Recorded By, (t) = Taken By, (c) = Cosigned By    Initials Name Provider Type    Waqas Hurst, PTA Physical Therapy Assistant    Kalie Woods OT Occupational Therapist              Therapy Charges for Today     Code Description Service Date Service Provider Modifiers Qty    [id number] HC OT EVAL LOW COMPLEXITY 2 11/10/2021 Kalie Hensley OT GO 1               Kalie Hensley OT  11/10/2021

## 2021-11-10 NOTE — SIGNIFICANT NOTE
11/10/21 0758   Plan   Plan Comments Patient will discharge home today with [facility]. Patient grandson will be transporting patient home according to patient wife. Patient wife reported that patient has a walker, elevated toilet seat, shower stool.   Final Discharge Disposition Code 06 - home with home health care   Final Note [facility]

## 2021-11-10 NOTE — SIGNIFICANT NOTE
11/10/21 1832   Plan   Plan Comments Patient family decided that they want patient to go to inpatient rehab. SW sent rehab referral.

## 2021-11-11 ENCOUNTER — APPOINTMENT (OUTPATIENT)
Dept: GENERAL RADIOLOGY | Facility: HOSPITAL | Age: 81
End: 2021-11-11

## 2021-11-11 LAB
IRON 24H UR-MRATE: 29 MCG/DL (ref 59–158)
IRON SATN MFR SERPL: 12 % (ref 20–50)
QT INTERVAL: 398 MS
TIBC SERPL-MCNC: 232 MCG/DL (ref 298–536)
TRANSFERRIN SERPL-MCNC: 156 MG/DL (ref 200–360)
WHOLE BLOOD HOLD SPECIMEN: NORMAL

## 2021-11-11 PROCEDURE — 97110 THERAPEUTIC EXERCISES: CPT

## 2021-11-11 PROCEDURE — 84466 ASSAY OF TRANSFERRIN: CPT | Performed by: INTERNAL MEDICINE

## 2021-11-11 PROCEDURE — 74230 X-RAY XM SWLNG FUNCJ C+: CPT

## 2021-11-11 PROCEDURE — 83540 ASSAY OF IRON: CPT | Performed by: INTERNAL MEDICINE

## 2021-11-11 PROCEDURE — 92526 ORAL FUNCTION THERAPY: CPT

## 2021-11-11 PROCEDURE — 25010000002 ENOXAPARIN PER 10 MG: Performed by: ORTHOPAEDIC SURGERY

## 2021-11-11 PROCEDURE — 99233 SBSQ HOSP IP/OBS HIGH 50: CPT | Performed by: INTERNAL MEDICINE

## 2021-11-11 PROCEDURE — 25010000002 IRON SUCROSE PER 1 MG: Performed by: INTERNAL MEDICINE

## 2021-11-11 PROCEDURE — 94799 UNLISTED PULMONARY SVC/PX: CPT

## 2021-11-11 PROCEDURE — 92611 MOTION FLUOROSCOPY/SWALLOW: CPT

## 2021-11-11 RX ADMIN — BENZONATATE 100 MG: 100 CAPSULE ORAL at 08:14

## 2021-11-11 RX ADMIN — DOCUSATE SODIUM 50MG AND SENNOSIDES 8.6MG 2 TABLET: 8.6; 5 TABLET, FILM COATED ORAL at 08:15

## 2021-11-11 RX ADMIN — IRON SUCROSE 500 MG: 20 INJECTION, SOLUTION INTRAVENOUS at 10:22

## 2021-11-11 RX ADMIN — HYDROCODONE BITARTRATE AND ACETAMINOPHEN 2 TABLET: 7.5; 325 TABLET ORAL at 03:46

## 2021-11-11 RX ADMIN — SODIUM CHLORIDE, PRESERVATIVE FREE 10 ML: 5 INJECTION INTRAVENOUS at 21:02

## 2021-11-11 RX ADMIN — ATORVASTATIN CALCIUM 10 MG: 10 TABLET, FILM COATED ORAL at 08:15

## 2021-11-11 RX ADMIN — BARIUM SULFATE 50 ML: 400 SUSPENSION ORAL at 15:00

## 2021-11-11 RX ADMIN — ENOXAPARIN SODIUM 40 MG: 40 INJECTION SUBCUTANEOUS at 08:14

## 2021-11-11 RX ADMIN — BISACODYL 5 MG: 5 TABLET, COATED ORAL at 08:15

## 2021-11-11 RX ADMIN — SODIUM CHLORIDE, PRESERVATIVE FREE 10 ML: 5 INJECTION INTRAVENOUS at 08:23

## 2021-11-11 RX ADMIN — BENZONATATE 100 MG: 100 CAPSULE ORAL at 21:01

## 2021-11-11 RX ADMIN — HYDROCODONE BITARTRATE AND ACETAMINOPHEN 1 TABLET: 7.5; 325 TABLET ORAL at 21:01

## 2021-11-11 RX ADMIN — DOCUSATE SODIUM 50MG AND SENNOSIDES 8.6MG 2 TABLET: 8.6; 5 TABLET, FILM COATED ORAL at 21:01

## 2021-11-11 NOTE — SIGNIFICANT NOTE
11/11/21 0759   Plan   Plan Comments [facility] can offer patient a rehab bed. [facility] can offer patient a rehab bed. Patient wife and grandson accepted a rehab bed at [facility].

## 2021-11-11 NOTE — MBS/VFSS/FEES
Acute Care - Speech Language Pathology   Swallow Modified Barium Swallow  [place]     Patient Name: Sukhwinder Marrero  : 1940  MRN: [id number]  Today's Date: 2021               Admit Date: 2021    Visit Dx:     ICD-10-CM ICD-9-CM   1. Closed fracture of right hip, initial encounter ([facility])  S72.001A 820.8   2. Acute renal failure, unspecified acute renal failure type ([facility])  N17.9 584.9   3. Difficulty in walking  R26.2 719.7   4. Dysphagia, oropharyngeal  R13.12 787.22   5. Decreased activities of daily living (ADL)  Z78.9 V49.89     Patient Active Problem List   Diagnosis   • Closed fracture of right hip (HCC)     Past Medical History:   Diagnosis Date   • Hyperlipidemia    • Hypertension      Past Surgical History:   Procedure Laterality Date   • FEMUR IM NAILING/RODDING Right 2021    Procedure: FEMUR INTRAMEDULLARY NAILING/RODDING;  Surgeon: Aristides Nevarez MD;  Location: [facility]  Service: Orthopedics;  Laterality: Right;     PERTINENT INFORMATION:  Patient is a 81 year old admitted with hip fracture.  Status post surgical intervention.  Patient now demonstrating difficulty swallowing with report of retrosternal sensation of food sticking and intermittently globus sensation of food sticking.  Intermittent signs of aspiration noted at bedside..    Patient was referred for an MBSS by Dr. Roach to rule out aspiration as well as to determine appropriate treatment plan for this patient.      PROCEDURE:    Patient was alert and cooperative.  The patient was viewed in lateral projection.  The following Ba consistencies were administered: Nectar thick and purée consistencies only..  The following compensatory swallowing strategies were performed: N/A      RESULTS:    1.  Limited trials presented due to study being discontinued secondary to patient vomiting.  Swallow was completed with nectar thick and purée consistencies.  Oral stage was characterized by sluggish bolus preparation and  reduced bolus control noted at times.  Slow oral transit with reduced A-P propulsion of bolus.  Swallow completed without penetration or aspiration noted.  Mild pharyngeal residue noted after the swallow with nectar thick consistencies..  Appears to have reduced epiglottic inversion.  Session discontinued as stated after limited trials due to patient's vomiting.      IMPRESSIONS:    Patient demonstrated moderate oral stage dysphagia characterized by slow bolus preparation and reduced bolus control with difficulty with A-P propulsion of bolus and oral transit.  No penetration or aspiration noted with nectar thick liquids or purée consistencies.  No significant pharyngeal residue noted after the swallow.  Given patient's vomiting after after given p.o. trials, along with patient's report of retrosternal sensation of solids sticking, may benefit from GI consult.    FUNCTIONAL DEFICIT: Patient scored level 4 of 7 on Functional Communication Measures for swallowing indicating a 40-59% limitation in function for current status, goal status, and discharge status.      RECOMMENDATIONS:   1.  Purée diet-single sips of thin liquids (NO STRAWS)   2.  90 degrees upright for all intake  3.  Slow rate, small bites/drinks - Double swallow  4.  Oral meds crushed in applesauce/yogurt  5.  Strict reflux precautions, remain upright for 30 minutes after intake    Could benefit from GI consult    YES: Patient/responsible party agrees with the plan of care and has been informed of all alternatives, risks and benefits.    Thank you for this referral.        EDUCATION  The patient has been educated in the following areas:   Dysphagia (Swallowing Impairment).            Vickie Weinstein, SLP  11/11/2021

## 2021-11-11 NOTE — THERAPY TREATMENT NOTE
Acute Care - Speech Language Pathology   Swallow Treatment Note  [place]     Patient Name: Sukhwinder Marrero  : 1940  MRN: [id number]  Today's Date: 2021               Admit Date: 2021    Visit Dx:     ICD-10-CM ICD-9-CM   1. Closed fracture of right hip, initial encounter ([facility])  S72.001A 820.8   2. Acute renal failure, unspecified acute renal failure type ([facility])  N17.9 584.9   3. Difficulty in walking  R26.2 719.7   4. Dysphagia, oropharyngeal  R13.12 787.22   5. Decreased activities of daily living (ADL)  Z78.9 V49.89     Patient Active Problem List   Diagnosis   • Closed fracture of right hip ([facility])     Past Medical History:   Diagnosis Date   • Hyperlipidemia    • Hypertension      Past Surgical History:   Procedure Laterality Date   • FEMUR IM NAILING/RODDING Right 2021    Procedure: FEMUR INTRAMEDULLARY NAILING/RODDING;  Surgeon: Aristides Nevarez MD;  Location: [facility];  Service: Orthopedics;  Laterality: Right;     Subjective/Behavioral Observations: Patient seen for dysphagia therapy    Current Diet: Mechanical soft diet single sips of thin liquids    Current Strategies: Small bites/drinks, alternate solids and liquids    Treatment received: Patient seen in morning meal.  Reduced overall intake noted.  Patient tolerating soft solids initially however reports retrosternal sensation of sticking.  Liquid wash attempted first patient states this did not alleviate retrosternal sensation of food sticking.  Swallow completed with thin liquids with cough noted with straw trials.  Tolerated single sips of thin liquids from cup well without clinical sign or symptom of aspiration.  Patient was noted to hold bolus at times required cue to swallow.  Continued education on compensatory swallow strategies.    Progress toward goals: As stated    Barriers to Achieving goals: Medical status    Plan of care:/changes in plan: Continue with current diet.  Modified barium swallow study scheduled  later today       EDUCATION  The patient has been educated in the following areas:   Modified Diet Instruction.            Vickie Weinstein, SLP  11/11/2021

## 2021-11-11 NOTE — PLAN OF CARE
Goal Outcome Evaluation:  Plan of Care Reviewed With: patient        Progress: no change  Outcome Summary: Patient complaints of pain managed with PO pain medication. VSS. Tolerated dressing change well. Should D/C to SNF this upcoming shift.

## 2021-11-11 NOTE — PROGRESS NOTES
[facility]     Progress Note    Patient Name: Sukhwinder Marrero  : 1940  MRN: [id number]  Primary Care Physician:  Provider, No Known  Date of admission: 2021    Subjective   Subjective     HPI:  Patient Reports no new events overnight.  His pain is currently controlled.  He does have some pain with activity and weightbearing.  No complaints of chest pain or shortness of air.    Review of Systems   See HPI    Objective   Objective     Vitals:   Temp:  [97.6 °F (36.4 °C)-99 °F (37.2 °C)] 99 °F (37.2 °C)  Heart Rate:  [] 101  Resp:  [17-20] 20  BP: (114-132)/(58-71) 122/71  Flow (L/min):  [2] 2  Physical Exam    General: Alert, no acute distress   Chest: Unlabored breathing, cardiovascular: Regular heart rate   Musculoskeletal: Dressing intact to right hip.  Neurovascular intact to extremity.  Negative Ayse.    Result Review      WBC   Date Value Ref Range Status   11/10/2021 9.55 3.40 - 10.80 10*3/mm3 Final     RBC   Date Value Ref Range Status   11/10/2021 2.92 (L) 4.14 - 5.80 10*6/mm3 Final     Hemoglobin   Date Value Ref Range Status   11/10/2021 9.2 (L) 13.0 - 17.7 g/dL Final     Hematocrit   Date Value Ref Range Status   11/10/2021 28.4 (L) 37.5 - 51.0 % Final     MCV   Date Value Ref Range Status   11/10/2021 97.3 (H) 79.0 - 97.0 fL Final     MCH   Date Value Ref Range Status   11/10/2021 31.5 26.6 - 33.0 pg Final     MCHC   Date Value Ref Range Status   11/10/2021 32.4 31.5 - 35.7 g/dL Final     RDW   Date Value Ref Range Status   11/10/2021 13.2 12.3 - 15.4 % Final     RDW-SD   Date Value Ref Range Status   11/10/2021 47.0 37.0 - 54.0 fl Final     MPV   Date Value Ref Range Status   11/10/2021 11.7 6.0 - 12.0 fL Final     Platelets   Date Value Ref Range Status   11/10/2021 151 140 - 450 10*3/mm3 Final     Neutrophil %   Date Value Ref Range Status   11/10/2021 78.0 (H) 42.7 - 76.0 % Final     Lymphocyte %   Date Value Ref Range Status   11/10/2021 10.5 (L) 19.6 - 45.3 % Final      Monocyte %   Date Value Ref Range Status   11/10/2021 10.8 5.0 - 12.0 % Final     Eosinophil %   Date Value Ref Range Status   11/10/2021 0.2 (L) 0.3 - 6.2 % Final     Basophil %   Date Value Ref Range Status   11/10/2021 0.1 0.0 - 1.5 % Final     Immature Grans %   Date Value Ref Range Status   11/10/2021 0.4 0.0 - 0.5 % Final     Neutrophils, Absolute   Date Value Ref Range Status   11/10/2021 7.45 (H) 1.70 - 7.00 10*3/mm3 Final     Lymphocytes, Absolute   Date Value Ref Range Status   11/10/2021 1.00 0.70 - 3.10 10*3/mm3 Final     Monocytes, Absolute   Date Value Ref Range Status   11/10/2021 1.03 (H) 0.10 - 0.90 10*3/mm3 Final     Eosinophils, Absolute   Date Value Ref Range Status   11/10/2021 0.02 0.00 - 0.40 10*3/mm3 Final     Basophils, Absolute   Date Value Ref Range Status   11/10/2021 0.01 0.00 - 0.20 10*3/mm3 Final     Immature Grans, Absolute   Date Value Ref Range Status   11/10/2021 0.04 0.00 - 0.05 10*3/mm3 Final     nRBC   Date Value Ref Range Status   11/10/2021 0.0 0.0 - 0.2 /100 WBC Final        Result Review:  I have personally reviewed the results from the time of this admission to 11/11/2021 07:09 EST and agree with these findings:  [x]  Laboratory  []  Microbiology  [x]  Radiology  []  EKG/Telemetry   []  Cardiology/Vascular   []  Pathology  []  Old records  []  Other:      Assessment/Plan   Assessment / Plan     Brief Patient Summary:  Sukhwinder Marrero is a 81 y.o. male who is status post right hip cephalomedullary nailing    Active Hospital Problems:  Active Hospital Problems    Diagnosis    • Closed fracture of right hip (HCC)      Plan: Weightbearing as tolerated.  Fall precaution, walker.  Physical and Occupational Therapy.  Plan for discharge when medically able.  Patient would benefit from inpatient rehab placement.       DVT prophylaxis:  Medical and mechanical DVT prophylaxis orders are present.    CODE STATUS:      Disposition:  I expect patient to be discharged to inpatient  rehab when medically able.    Electronically signed by Aristides Nevarez MD, 11/11/21, 7:09 AM EST.

## 2021-11-11 NOTE — PLAN OF CARE
Goal Outcome Evaluation:              Outcome Summary: Patient pain level was well controlled. no complaints of pain today. swallow study today. patient suppose to discharge tomorrow to [facility]. vital signs stable

## 2021-11-11 NOTE — PROGRESS NOTES
[facility]   Hospitalist Progress Note  Date: 2021  Patient Name: Sukhwinder Marrero  : 1940  MRN: [id number]  Date of admission: 2021      Subjective   Subjective     Chief Complaint:   Mechanical fall resulting in right hip pain    Summary:   Sukhwinder Marrero is a 80 y/o male with a h/o HTN, hyperlipidemia who presents after a mechanical fall. States he was outside lifting a chair when his feet got tangled and he fell on his right side. Did not hit head or lose consciousness. Son states he has been getting progressively more unsteady on his feet lately and had another fall in his yard recently. He denies any complaints other than pain in his right hip. He denies any cardiac or pulmonary issues. Never has chest pain or sob. Is fairly active on a normal day.   Patient had gamma nailing of right hip fracture by Dr. Nevarez on .  CT chest noted.  No pulmonary symptoms.    Interval Followup:   Patient oxygen saturation anywhere from 85 to 90% on room air.  Now on 2 L  Pain in right hip is better.  Does complain of cough.  Denies any choking with swallowing.  Does have difficulty swallowing for long time  No BM yet.  Has been passing gas  Patient hard of hearing.  Patient grandson present at bedside    Review of Systems   All systems were reviewed and negative except for: Patient states if he does not move his right hip pain is controlled    Objective   Objective     Vitals:   Temp:  [97.7 °F (36.5 °C)-99 °F (37.2 °C)] 98.4 °F (36.9 °C)  Heart Rate:  [] 104  Resp:  [18-22] 22  BP: (115-128)/(50-71) 115/50  Flow (L/min):  [2] 2  Physical Exam   Gen: NAD, conversant, alert and oriented x3  Eyes: conjunctiva clear, moist mucous membranes  HENT: Atraumatic, oropharynx clear with moist mucous membranes  Neck: No lymphadenopathy, no thyromegaly  Resp: normal respiratory effort, CTAB, no crackles  CV: RRR, no MRGs, no peripheral edema  GI: Soft, nontender, nondistended, (+) BS.  M/S:  Patient's right lower extremity shortened and externally rotated, neurovascularly distally intact  Neuro: normal motor function in 3 ext, limited range of motion due to pain, CN 2-12 intact    Result Review    Result Review:  I have personally reviewed the results from the time of this admission to 11/11/2021 16:30 EST and agree with these findings:  [x]  Laboratory  []  Microbiology  [x]  Radiology  [x]  EKG/Telemetry   [x]  Cardiology/Vascular   []  Pathology  [x]  Old records  [x]  Other: Medications    Assessment/Plan   Assessment / Plan     Assessment/Plan:  Right intertrochanteric femur fracture.  S/p gamma nailing by Dr. Nevarez on November 9  Mechanical ground-level fall causing above  Bibasilar opacities right more than left on CT scan.  Asymptomatic.  Question aspiration  Hyperkalemia.  Resolved  NAEL.  Resolved  Acute anemia.  Likely postop blood loss and due to IV hydration  Iron deficiency anemia.  S/p IV iron transfusion  Hypertension  Hyperlipidemia  Acute hypoxic respiratory failure.  Patient does not use oxygen at home.  Moderately large hiatal hernia with fluid-filled esophagus indicating esophageal dysmotility.    Plan:  Orthopedic surgery has been consulted, appreciate assistance  Bowel regimen  CT of chest reviewed, patient with no complaints of shortness of breath nor aspirations.  Repeat chest x-ray with worsening.  Need GI evaluation as an outpatient  Speech therapy consulted.  Appreciate input.  Modified barium swallow study noted  Nebulizer treatment as needed.  Incentive spirometry.  Tessalon for cough  IV and oral narcotics for pain control   no baseline creatinine has been found for patient however has improved since admission  Patient's hyperkalemia has resolved, continue to monitor  PT OT  Discussed with  and RN.  Patient's wife changed her mind now and wants him to go to rehab.  To [facility] in morning    DVT prophylaxis:  Medical and mechanical DVT prophylaxis orders  are present.    CODE STATUS:   Code Status (Patient has no pulse and is not breathing): CPR (Attempt to Resuscitate)  Medical Interventions (Patient has pulse or is breathing): Full Support            Electronically signed by Kostas Roach MD, 11/11/21, 4:32 PM EST.

## 2021-11-11 NOTE — THERAPY TREATMENT NOTE
Acute Care - Physical Therapy Treatment Note   [place]     Patient Name: Sukhwinder Marrero  : 1940  MRN: [id number]  Today's Date: 2021      Visit Dx:     ICD-10-CM ICD-9-CM   1. Closed fracture of right hip, initial encounter (HCC)  S72.001A 820.8   2. Acute renal failure, unspecified acute renal failure type (HCC)  N17.9 584.9   3. Difficulty in walking  R26.2 719.7   4. Dysphagia, oropharyngeal  R13.12 787.22   5. Decreased activities of daily living (ADL)  Z78.9 V49.89     Patient Active Problem List   Diagnosis   • Closed fracture of right hip (HCC)     Past Medical History:   Diagnosis Date   • Hyperlipidemia    • Hypertension      Past Surgical History:   Procedure Laterality Date   • FEMUR IM NAILING/RODDING Right 2021    Procedure: FEMUR INTRAMEDULLARY NAILING/RODDING;  Surgeon: Aristides Nevarez MD;  Location: [facility] OR;  Service: Orthopedics;  Laterality: Right;     PT Assessment (last 12 hours)     PT Evaluation and Treatment     Row Name 21 1249          Physical Therapy Time and Intention    Subjective Information no complaints  -RH     Document Type therapy note (daily note)  -RH     Mode of Treatment physical therapy; individual therapy  -RH     Patient Effort fair  -RH     Row Name 21 1249          Pain    Additional Documentation Pain Scale: FACES Pre/Post-Treatment (Group)  -     Row Name 21 1249          Pain Scale: FACES Pre/Post-Treatment    Pain: FACES Scale, Pretreatment 2-->hurts little bit  -RH     Posttreatment Pain Rating 4-->hurts little more  -RH     Pain Location - Side Right  -RH     Pain Location hip  -RH     Row Name 21 1249          Range of Motion (ROM)    Range of Motion right lower extremity  -RH     Hip, Right (Range of Motion) --  R hip AAROM 85 degrees with guarding.  -     Row Name 21 1249          Strength (Manual Muscle Testing)    Strength (Manual Muscle Testing) right lower extremity  -RH     Hip, Right  (Strength) --  R hip flex strength 2/5  -RH     Row Name             Wound 11/08/21 1635 Right anterior Incision    Wound - Properties Group Placement Date: 11/08/21  -AM Placement Time: 1635  -AM Side: Right  -AM Orientation: anterior  -AM Primary Wound Type: Incision  -AM, X3      Retired Wound - Properties Group Date first assessed: 11/08/21  -AM Time first assessed: 1635  -AM Side: Right  -AM Primary Wound Type: Incision  -AM, X3      Row Name 11/11/21 1249          Progress Summary (PT)    Progress Toward Functional Goals (PT) progress toward functional goals as expected  -           User Key  (r) = Recorded By, (t) = Taken By, (c) = Cosigned By    Initials Name Provider Type    AM Leonila Deshpande RN Registered Nurse     Waqas Mcnair PTA Physical Therapy Assistant            Right Hip Ther -ex   Exercise  Reps  Sets          Short arc quads  10 2   Heel slides  10 1   Ankle pumps  10 2   Quad sets  10 2   Glut sets  10 2   Abduction/ Adduction  10 2      Physical Therapy Education                 Title: PT OT SLP Therapies (Done)     Topic: Physical Therapy (Done)     Point: Mobility training (Done)     Learning Progress Summary           Patient Acceptance, E, VU by  at 11/11/2021 1025    Comment: PATIENTEDUCATED ON THE IMPORTANCE OF IRON . ALSO INSTRUCTED TO NOTIFY RN FOR ANY CHANGES NOTED.    Acceptance, E,TB, VU by  at 11/9/2021 1443                   Point: Home exercise program (Done)     Learning Progress Summary           Patient Acceptance, E, VU by BB at 11/11/2021 1025    Comment: PATIENTEDUCATED ON THE IMPORTANCE OF IRON . ALSO INSTRUCTED TO NOTIFY RN FOR ANY CHANGES NOTED.    Acceptance, E,TB, VU by  at 11/9/2021 1443                   Point: Body mechanics (Done)     Learning Progress Summary           Patient Acceptance, E, VU by BB at 11/11/2021 1025    Comment: PATIENTEDUCATED ON THE IMPORTANCE OF IRON . ALSO INSTRUCTED TO NOTIFY RN FOR ANY CHANGES NOTED.    Acceptance, E,TB, VU by   at 11/9/2021 1443                   Point: Precautions (Done)     Learning Progress Summary           Patient Acceptance, E, VU by  at 11/11/2021 1025    Comment: PATIENTEDUCATED ON THE IMPORTANCE OF IRON . ALSO INSTRUCTED TO NOTIFY RN FOR ANY CHANGES NOTED.    Acceptance, E,TB, VU by  at 11/9/2021 1443                               User Key     Initials Effective Dates Name Provider Type Discipline     06/16/21 -  Nara Mora, RN Registered Nurse Nurse     09/06/21 -  Edwin Munoz, TAYLOR Student PT Student PT              PT Recommendation and Plan     Progress Summary (PT)  Progress Toward Functional Goals (PT): progress toward functional goals as expected   Outcome Measures     Row Name 11/11/21 1253 11/10/21 1200 11/09/21 1442       How much help from another person do you currently need...    Turning from your back to your side while in flat bed without using bedrails? 3  - 2  - 2  -CARMELO (r) SH (t) CARMELO (c)    Moving from lying on back to sitting on the side of a flat bed without bedrails? 3  - 2  - 2  -CARMELO (r) SH (t) CARMELO (c)    Moving to and from a bed to a chair (including a wheelchair)? 1  - 2  - 2  -CARMELO (r) SH (t) CARMELO (c)    Standing up from a chair using your arms (e.g., wheelchair, bedside chair)? 1  - 1  - 2  -CARMELO (r) SH (t) CARMELO (c)    Climbing 3-5 steps with a railing? 1  - 2  - 1  -CARMELO (r) SH (t) CARMELO (c)    To walk in hospital room? 1  - 2  - 2  -CARMELO (r) SH (t) CARMELO (c)    AM-PAC 6 Clicks Score (PT) 10  - 11  - 11  -CARMELO (r) SH (t)       Functional Assessment    Outcome Measure Options -- -- AM-PAC 6 Clicks Basic Mobility (PT)  -CARMELO (r) SH (t) CARMELO (c)          User Key  (r) = Recorded By, (t) = Taken By, (c) = Cosigned By    Initials Name Provider Type     Waqas Mcnair, CARLA Physical Therapy Assistant    Buddy Sanchez, PT Physical Therapist    SH Edwin Munoz, PT Student PT Student                 Time Calculation:    PT Charges     Row Name 11/11/21 5526              Timed Charges    61264 - PT Therapeutic Exercise Minutes 23  -RH              Total Minutes    Timed Charges Total Minutes 23  -RH       Total Minutes 23  -RH            User Key  (r) = Recorded By, (t) = Taken By, (c) = Cosigned By    Initials Name Provider Type     Waqas Mcnair PTA Physical Therapy Assistant              Therapy Charges for Today     Code Description Service Date Service Provider Modifiers Qty    72377999328 HC PT THER PROC EA 15 MIN 11/10/2021 Waqas Mcnair PTA GP 2    91842257419 HC PT THERAPEUTIC ACT EA 15 MIN 11/10/2021 Waqas Mcnair PTA GP 1    31752865621 HC PT THER PROC EA 15 MIN 11/11/2021 Waqas Mcnair PTA GP 2          PT G-Codes  Outcome Measure Options: AM-PAC 6 Clicks Daily Activity (OT), Optimal Instrument  AM-PAC 6 Clicks Score (PT): 10  AM-PAC 6 Clicks Score (OT): 16    Waqas Mcnair PTA  11/11/2021

## 2021-11-12 VITALS
OXYGEN SATURATION: 100 % | RESPIRATION RATE: 16 BRPM | WEIGHT: 182.32 LBS | DIASTOLIC BLOOD PRESSURE: 57 MMHG | SYSTOLIC BLOOD PRESSURE: 137 MMHG | TEMPERATURE: 98.1 F | BODY MASS INDEX: 27.63 KG/M2 | HEIGHT: 68 IN | HEART RATE: 96 BPM

## 2021-11-12 PROBLEM — N17.9 ACUTE RENAL FAILURE: Status: ACTIVE | Noted: 2021-11-12

## 2021-11-12 PROBLEM — E87.5 HYPERKALEMIA: Status: RESOLVED | Noted: 2021-11-12 | Resolved: 2021-11-12

## 2021-11-12 PROBLEM — R09.02 HYPOXEMIA: Status: ACTIVE | Noted: 2021-11-12

## 2021-11-12 PROBLEM — N17.9 ACUTE RENAL FAILURE (HCC): Status: RESOLVED | Noted: 2021-11-12 | Resolved: 2021-11-12

## 2021-11-12 PROBLEM — S72.001A CLOSED FRACTURE OF RIGHT HIP (HCC): Status: RESOLVED | Noted: 2021-11-07 | Resolved: 2021-11-12

## 2021-11-12 PROBLEM — K22.4 ESOPHAGEAL DYSMOTILITY: Status: ACTIVE | Noted: 2021-11-12

## 2021-11-12 PROBLEM — E87.5 HYPERKALEMIA: Status: ACTIVE | Noted: 2021-11-12

## 2021-11-12 PROBLEM — R13.10 DYSPHAGIA: Status: ACTIVE | Noted: 2021-11-12

## 2021-11-12 PROBLEM — D50.9 IRON DEFICIENCY ANEMIA: Status: ACTIVE | Noted: 2021-11-12

## 2021-11-12 PROBLEM — R09.02 HYPOXEMIA: Status: RESOLVED | Noted: 2021-11-12 | Resolved: 2021-11-12

## 2021-11-12 PROBLEM — E87.1 HYPONATREMIA: Status: ACTIVE | Noted: 2021-11-12

## 2021-11-12 LAB
ALBUMIN SERPL-MCNC: 3.1 G/DL (ref 3.5–5.2)
ALBUMIN/GLOB SERPL: 1.2 G/DL
ALP SERPL-CCNC: 48 U/L (ref 39–117)
ALT SERPL W P-5'-P-CCNC: 9 U/L (ref 1–41)
ANION GAP SERPL CALCULATED.3IONS-SCNC: 7.8 MMOL/L (ref 5–15)
AST SERPL-CCNC: 32 U/L (ref 1–40)
BASOPHILS # BLD AUTO: 0.02 10*3/MM3 (ref 0–0.2)
BASOPHILS NFR BLD AUTO: 0.2 % (ref 0–1.5)
BILIRUB SERPL-MCNC: 0.8 MG/DL (ref 0–1.2)
BUN SERPL-MCNC: 23 MG/DL (ref 8–23)
BUN/CREAT SERPL: 19.8 (ref 7–25)
CALCIUM SPEC-SCNC: 8.4 MG/DL (ref 8.6–10.5)
CHLORIDE SERPL-SCNC: 93 MMOL/L (ref 98–107)
CO2 SERPL-SCNC: 27.2 MMOL/L (ref 22–29)
CREAT SERPL-MCNC: 1.16 MG/DL (ref 0.76–1.27)
D-LACTATE SERPL-SCNC: 0.9 MMOL/L (ref 0.5–2)
DEPRECATED RDW RBC AUTO: 43.6 FL (ref 37–54)
EOSINOPHIL # BLD AUTO: 0.08 10*3/MM3 (ref 0–0.4)
EOSINOPHIL NFR BLD AUTO: 0.9 % (ref 0.3–6.2)
ERYTHROCYTE [DISTWIDTH] IN BLOOD BY AUTOMATED COUNT: 12.8 % (ref 12.3–15.4)
GFR SERPL CREATININE-BSD FRML MDRD: 60 ML/MIN/1.73
GLOBULIN UR ELPH-MCNC: 2.6 GM/DL
GLUCOSE SERPL-MCNC: 110 MG/DL (ref 65–99)
HCT VFR BLD AUTO: 24.8 % (ref 37.5–51)
HGB BLD-MCNC: 8.3 G/DL (ref 13–17.7)
IMM GRANULOCYTES # BLD AUTO: 0.14 10*3/MM3 (ref 0–0.05)
IMM GRANULOCYTES NFR BLD AUTO: 1.5 % (ref 0–0.5)
LYMPHOCYTES # BLD AUTO: 1.03 10*3/MM3 (ref 0.7–3.1)
LYMPHOCYTES NFR BLD AUTO: 11.1 % (ref 19.6–45.3)
MCH RBC QN AUTO: 31.3 PG (ref 26.6–33)
MCHC RBC AUTO-ENTMCNC: 33.5 G/DL (ref 31.5–35.7)
MCV RBC AUTO: 93.6 FL (ref 79–97)
MONOCYTES # BLD AUTO: 0.86 10*3/MM3 (ref 0.1–0.9)
MONOCYTES NFR BLD AUTO: 9.2 % (ref 5–12)
NEUTROPHILS NFR BLD AUTO: 7.18 10*3/MM3 (ref 1.7–7)
NEUTROPHILS NFR BLD AUTO: 77.1 % (ref 42.7–76)
NRBC BLD AUTO-RTO: 0 /100 WBC (ref 0–0.2)
PLATELET # BLD AUTO: 172 10*3/MM3 (ref 140–450)
PMV BLD AUTO: 11.2 FL (ref 6–12)
POTASSIUM SERPL-SCNC: 4.1 MMOL/L (ref 3.5–5.2)
PROCALCITONIN SERPL-MCNC: 0.33 NG/ML (ref 0–0.25)
PROT SERPL-MCNC: 5.7 G/DL (ref 6–8.5)
RBC # BLD AUTO: 2.65 10*6/MM3 (ref 4.14–5.8)
SODIUM SERPL-SCNC: 128 MMOL/L (ref 136–145)
WBC # BLD AUTO: 9.31 10*3/MM3 (ref 3.4–10.8)

## 2021-11-12 PROCEDURE — 99239 HOSP IP/OBS DSCHRG MGMT >30: CPT | Performed by: INTERNAL MEDICINE

## 2021-11-12 PROCEDURE — 25010000002 ENOXAPARIN PER 10 MG: Performed by: ORTHOPAEDIC SURGERY

## 2021-11-12 PROCEDURE — 83605 ASSAY OF LACTIC ACID: CPT | Performed by: INTERNAL MEDICINE

## 2021-11-12 PROCEDURE — 85025 COMPLETE CBC W/AUTO DIFF WBC: CPT | Performed by: INTERNAL MEDICINE

## 2021-11-12 PROCEDURE — 80053 COMPREHEN METABOLIC PANEL: CPT | Performed by: INTERNAL MEDICINE

## 2021-11-12 PROCEDURE — 84145 PROCALCITONIN (PCT): CPT | Performed by: INTERNAL MEDICINE

## 2021-11-12 RX ORDER — SODIUM PHOSPHATE,MONO-DIBASIC 19G-7G/118
1 ENEMA (ML) RECTAL ONCE
Status: COMPLETED | OUTPATIENT
Start: 2021-11-12 | End: 2021-11-12

## 2021-11-12 RX ORDER — ATORVASTATIN CALCIUM 10 MG/1
10 TABLET, FILM COATED ORAL NIGHTLY
Status: DISCONTINUED | OUTPATIENT
Start: 2021-11-12 | End: 2021-11-12 | Stop reason: HOSPADM

## 2021-11-12 RX ADMIN — BENZONATATE 100 MG: 100 CAPSULE ORAL at 17:45

## 2021-11-12 RX ADMIN — HYDROCODONE BITARTRATE AND ACETAMINOPHEN 2 TABLET: 7.5; 325 TABLET ORAL at 02:50

## 2021-11-12 RX ADMIN — HYDROCODONE BITARTRATE AND ACETAMINOPHEN 1 TABLET: 7.5; 325 TABLET ORAL at 08:17

## 2021-11-12 RX ADMIN — BENZONATATE 100 MG: 100 CAPSULE ORAL at 08:22

## 2021-11-12 RX ADMIN — SODIUM CHLORIDE, PRESERVATIVE FREE 10 ML: 5 INJECTION INTRAVENOUS at 09:49

## 2021-11-12 RX ADMIN — POLYETHYLENE GLYCOL 3350 17 G: 17 POWDER, FOR SOLUTION ORAL at 08:17

## 2021-11-12 RX ADMIN — SODIUM PHOSPHATE 1 ENEMA: 7; 19 ENEMA RECTAL at 10:50

## 2021-11-12 RX ADMIN — ENOXAPARIN SODIUM 40 MG: 40 INJECTION SUBCUTANEOUS at 08:17

## 2021-11-12 RX ADMIN — DOCUSATE SODIUM 50MG AND SENNOSIDES 8.6MG 2 TABLET: 8.6; 5 TABLET, FILM COATED ORAL at 08:17

## 2021-11-12 NOTE — PROGRESS NOTES
[facility]     Progress Note    Patient Name: Sukhwinder Marrero  : 1940  MRN: [id number]  Primary Care Physician:  Provider, No Known  Date of admission: 2021    Subjective   Subjective     HPI:  Patient Reports doing well.  No new complaints.  Alert and oriented.    Review of Systems   See HPI    Objective   Objective     Vitals:   Temp:  [97.1 °F (36.2 °C)-98.7 °F (37.1 °C)] 97.7 °F (36.5 °C)  Heart Rate:  [] 91  Resp:  [16-22] 18  BP: (115-138)/(50-71) 138/68  Flow (L/min):  [2] 2  Physical Exam    General: Alert, no acute distress   Chest: Unlabored breathing, cardiovascular: Regular heart rate   Musculoskeletal: Dressing intact.  There is some bloody drainage around the most distal thigh incision.  No signs of infection.  Neurovascular intact to extremity.    Result Review      WBC   Date Value Ref Range Status   2021 9.31 3.40 - 10.80 10*3/mm3 Final     RBC   Date Value Ref Range Status   2021 2.65 (L) 4.14 - 5.80 10*6/mm3 Final     Hemoglobin   Date Value Ref Range Status   2021 8.3 (L) 13.0 - 17.7 g/dL Final     Hematocrit   Date Value Ref Range Status   2021 24.8 (L) 37.5 - 51.0 % Final     MCV   Date Value Ref Range Status   2021 93.6 79.0 - 97.0 fL Final     MCH   Date Value Ref Range Status   2021 31.3 26.6 - 33.0 pg Final     MCHC   Date Value Ref Range Status   2021 33.5 31.5 - 35.7 g/dL Final     RDW   Date Value Ref Range Status   2021 12.8 12.3 - 15.4 % Final     RDW-SD   Date Value Ref Range Status   2021 43.6 37.0 - 54.0 fl Final     MPV   Date Value Ref Range Status   2021 11.2 6.0 - 12.0 fL Final     Platelets   Date Value Ref Range Status   2021 172 140 - 450 10*3/mm3 Final     Neutrophil %   Date Value Ref Range Status   2021 77.1 (H) 42.7 - 76.0 % Final     Lymphocyte %   Date Value Ref Range Status   2021 11.1 (L) 19.6 - 45.3 % Final     Monocyte %   Date Value Ref Range Status   2021  9.2 5.0 - 12.0 % Final     Eosinophil %   Date Value Ref Range Status   11/12/2021 0.9 0.3 - 6.2 % Final     Basophil %   Date Value Ref Range Status   11/12/2021 0.2 0.0 - 1.5 % Final     Immature Grans %   Date Value Ref Range Status   11/12/2021 1.5 (H) 0.0 - 0.5 % Final     Neutrophils, Absolute   Date Value Ref Range Status   11/12/2021 7.18 (H) 1.70 - 7.00 10*3/mm3 Final     Lymphocytes, Absolute   Date Value Ref Range Status   11/12/2021 1.03 0.70 - 3.10 10*3/mm3 Final     Monocytes, Absolute   Date Value Ref Range Status   11/12/2021 0.86 0.10 - 0.90 10*3/mm3 Final     Eosinophils, Absolute   Date Value Ref Range Status   11/12/2021 0.08 0.00 - 0.40 10*3/mm3 Final     Basophils, Absolute   Date Value Ref Range Status   11/12/2021 0.02 0.00 - 0.20 10*3/mm3 Final     Immature Grans, Absolute   Date Value Ref Range Status   11/12/2021 0.14 (H) 0.00 - 0.05 10*3/mm3 Final     nRBC   Date Value Ref Range Status   11/12/2021 0.0 0.0 - 0.2 /100 WBC Final        Result Review:  I have personally reviewed the results from the time of this admission to 11/12/2021 07:19 EST and agree with these findings:  [x]  Laboratory  []  Microbiology  []  Radiology  []  EKG/Telemetry   []  Cardiology/Vascular   []  Pathology  []  Old records  []  Other:      Assessment/Plan   Assessment / Plan     Brief Patient Summary:  Sukhwinder Marrero is a 81 y.o. male who is status post right hip gamma nail    Active Hospital Problems:  Active Hospital Problems    Diagnosis    • Closed fracture of right hip (HCC)      Plan: Weightbearing as tolerated.  Physical and Occupational Therapy.  Pain control.  DVT prophylaxis.  Discharge to rehab when medically able.       DVT prophylaxis:  Medical and mechanical DVT prophylaxis orders are present.    CODE STATUS:   Code Status (Patient has no pulse and is not breathing): CPR (Attempt to Resuscitate)  Medical Interventions (Patient has pulse or is breathing): Full Support    Disposition:  I expect  patient to be discharged when medically able.    Electronically signed by Aristides Nevarez MD, 11/12/21, 7:19 AM EST.

## 2021-11-12 NOTE — PLAN OF CARE
Goal Outcome Evaluation:               Patient has not been wanting to take any pain medications but has been grimacing and yelling out with any movements.  Patient educated on pain control, pain medication, and tolerance with activity.  Patient agreed to take pain medication to help with pain control.  Patient tolerated well and got some rest.  Patient awaiting to go [facility] today.

## 2021-11-12 NOTE — DISCHARGE SUMMARY
[facility]        HOSPITALIST  DISCHARGE SUMMARY    Patient Name: Sukhwinder Marrero  : 1940  MRN: [id number]    Date of Admission: 2021  Date of Discharge:  2021  Primary Care Physician: Provider, No Known    Consults     Date and Time Order Name Status Description    2021  5:49 PM Inpatient Hospitalist Consult      2021  4:57 PM Inpatient Orthopedic Surgery Consult Completed           Active and Resolved Hospital Problems:  Active Hospital Problems    Diagnosis POA   • Hyponatremia [E87.1] No   • Esophageal dysmotility [K22.4] Yes   • Iron deficiency anemia [D50.9] No   • Dysphagia [R13.10] Yes      Resolved Hospital Problems    Diagnosis POA   • Hyperkalemia [E87.5] Yes   • Acute renal failure (HCC) [N17.9] Yes   • Hypoxemia [R09.02] Yes   • Closed fracture of right hip (HCC) [S72.001A] Yes       Hospital Course     Hospital Course:  Sukhwinder Marrero is a 81 y.o. male with a h/o HTN, hyperlipidemia who presents after a mechanical fall. States he was outside lifting a chair when his feet got tangled and he fell on his right side. Did not hit head or lose consciousness. Son states he has been getting progressively more unsteady on his feet lately and had another fall in his yard recently. He denies any complaints other than pain in his right hip. He denies any cardiac or pulmonary issues. Never has chest pain or sob. Is fairly active on a normal day.   Patient had gamma nailing of right hip fracture by Dr. Nevarez on .  CT chest noted.  No pulmonary symptoms.  Patient seen by speech-language pathology for possible aspiration and dysphagia.  Modified barium swallow Limited as patient vomited during the study but no jayro aspiration noted.  Diet modified by speech therapy.  His hypoxemia resolved and now is on room air.  Patient was given IV iron transfusion for iron deficiency anemia.  His anemia is thought to be postop blood loss and with IV hydration.   Patient creatinine improved with resolution of acute kidney injury and hyperkalemia has resolved.  Salazar catheter has been DC'd     Interval Followup:   Patient oxygen saturation anywhere from 85 to 90% on room air.  Now off oxygen with 95% saturation  Pain in right hip is better.  Does complain of cough.  Denies any choking with swallowing.  No more vomiting  Does have difficulty swallowing for long time  Had good BM after enema  Patient hard of hearing.    Cleared by Ortho to be released.  Discharge to rehab as patient stable.           DISCHARGE Follow Up Recommendations for labs and diagnostics: To see GI for esophageal dysmotility.  Monitor BMP and CBC      Day of Discharge     Vital Signs:  Temp:  [97.1 °F (36.2 °C)-98.7 °F (37.1 °C)] 98.2 °F (36.8 °C)  Heart Rate:  [] 94  Resp:  [16-22] 20  BP: (115-138)/(50-71) 130/64  Flow (L/min):  [2] 2    Physical Exam:     Gen: NAD, conversant, alert and oriented x3  Eyes: conjunctiva clear, moist mucous membranes  HENT: Atraumatic, oropharynx clear with moist mucous membranes  Neck: No lymphadenopathy, no thyromegaly  Resp: normal respiratory effort, CTAB, no crackles  CV: RRR, no MRGs, no peripheral edema  GI: Soft, nontender, nondistended, (+) BS.  M/S: Patient's right lower extremity shortened and externally rotated, neurovascularly distally intact  Neuro: normal motor function in 3 ext, limited range of motion due to pain, CN 2-12 intact       Discharge Details        Discharge Medications      New Medications      Instructions Start Date   apixaban 2.5 MG tablet tablet  Commonly known as: ELIQUIS   2.5 mg, Oral, 2 Times Daily         Continue These Medications      Instructions Start Date   simvastatin 20 MG tablet  Commonly known as: ZOCOR   20 mg, Oral, Nightly         Stop These Medications    aspirin 81 MG EC tablet     lisinopril-hydrochlorothiazide 20-25 MG per tablet  Commonly known as: PRINZIDE,ZESTORETIC            No Known Allergies    Discharge  Disposition:  Rehab Facility or Unit (DC - External).  In private vehicle with the family    Diet:  Diet Instructions     Advance Diet As Tolerated            Discharge Activity:   Activity Instructions     Activity as Tolerated            CODE STATUS:  Code Status and Medical Interventions:   Ordered at: 11/11/21 0917     Code Status (Patient has no pulse and is not breathing):    CPR (Attempt to Resuscitate)     Medical Interventions (Patient has pulse or is breathing):    Full Support         Future Appointments   Date Time Provider Department Center   11/23/2021  2:15 PM Aristides Nevarez MD [facility]       Additional Instructions for the Follow-ups that You Need to Schedule     Discharge Follow-up with PCP   As directed       Currently Documented PCP:    Provider, No Known    PCP Phone Number:    None     Follow Up Details: 1 week         Discharge Follow-up with Specialty: GI; 3 Weeks   As directed      Specialty: GI    Follow Up: 3 Weeks    Follow Up Details: Esophageal dysmotility, dysphagia and vomiting         Discharge Follow-up with Specified Provider: Dr. Nevarez; 2 Weeks   As directed      To: Dr. Nevarez    Follow Up: 2 Weeks    Follow Up Details: Right hip repair               Pertinent  and/or Most Recent Results     PROCEDURES:   Procedure:    FEMUR INTRAMEDULLARY NAILING/RODDING  CPT(R) Code:  72972 - [place] OPEN RX FEMUR FX+INTRAMED KATRINA       LAB RESULTS:      Lab 11/12/21  0505 11/10/21  0529 11/09/21  0416 11/07/21  1659   WBC 9.31 9.55 11.66* 10.50   HEMOGLOBIN 8.3* 9.2* 11.4* 14.7   HEMATOCRIT 24.8* 28.4* 33.8* 43.6   PLATELETS 172 151 158 200   NEUTROS ABS 7.18* 7.45*  --  8.23*   IMMATURE GRANS (ABS) 0.14* 0.04  --  0.05   LYMPHS ABS 1.03 1.00  --  1.35   MONOS ABS 0.86 1.03*  --  0.75   EOS ABS 0.08 0.02  --  0.06   MCV 93.6 97.3* 94.9 94.2   PROCALCITONIN 0.33*  --   --   --    LACTATE 0.9  --   --   --    PROTIME  --   --   --  10.1   APTT  --   --   --  23.4         Lab  11/12/21  0505 11/09/21  0416 11/08/21  0446 11/07/21  1659   SODIUM 128* 131* 136 137   POTASSIUM 4.1 4.4 4.6 5.6*   CHLORIDE 93* 98 100 101   CO2 27.2 24.2 26.5 24.8   ANION GAP 7.8 8.8 9.5 11.2   BUN 23 22 25* 28*   CREATININE 1.16 1.25 1.47* 1.68*   GLUCOSE 110* 149* 116* 112*   CALCIUM 8.4* 8.2* 8.8 9.6   MAGNESIUM  --  1.6  --   --          Lab 11/12/21  0505 11/07/21  1659   TOTAL PROTEIN 5.7* 7.0   ALBUMIN 3.10* 4.20   GLOBULIN 2.6 2.8   ALT (SGPT) 9 19   AST (SGOT) 32 34   BILIRUBIN 0.8 0.4   ALK PHOS 48 80         Lab 11/10/21  0955 11/07/21  1659   PROBNP 245.2  --    PROTIME  --  10.1   INR  --  0.95*             Lab 11/11/21  0525 11/07/21  1742 11/07/21  1659 11/07/21  1659   IRON 29*  --   --   --    IRON SATURATION 12*  --   --   --    TIBC 232*  --   --   --    TRANSFERRIN 156*  --   --   --    ABO TYPING  --  O   < > O   RH TYPING  --  Positive   < > Positive   ANTIBODY SCREEN  --   --   --  Negative    < > = values in this interval not displayed.         Brief Urine Lab Results     None        Microbiology Results (last 10 days)     Procedure Component Value - Date/Time    COVID-19,CEPHEID/KEVEN/BDMAX,COR/ÁNGEL/PAD/[facility] IN-HOUSE(OR EMERGENT/ADD-ON),NP SWAB IN TRANSPORT MEDIA 3-4 HR TAT, RT-PCR - Swab, Nasopharynx [[id number]]  (Normal) Collected: 11/07/21 1920    Lab Status: Final result Specimen: Swab from Nasopharynx Updated: 11/08/21 0829     COVID19 Not Detected    Narrative:      Fact sheet for providers: https://www.fda.gov/media/493845/download     Fact sheet for patients: https://www.fda.gov/media/176085/download  Fact sheet for providers: https://www.fda.gov/media/752133/download     Fact sheet for patients: https://www.fda.gov/media/806146/download                           Imaging Results (All)     Procedure Component Value Units Date/Time    FL Video Swallow With Speech Single Contrast [[id number]] Collected: 11/11/21 1523     Updated: 11/11/21 1526    Narrative:      PROCEDURE: FL VIDEO  SWALLOW W SPEECH SINGLE-CONTRAST     COMPARISON: None     INDICATIONS: rule out aspiration.  pneumonia     TECHNIQUE: Examination was performed in conjunction with the speech pathologist. The patient was   given both thin and thick liquid barium, applesauce with barium, contrast and cathi cracker with   Esophotrast. The patient's medication list was reviewed and documented in the medical record.     FINDINGS:   The exam was terminated prematurely due to patient vomiting during the examination.  No penetration   or aspiration was identified with nectar or applesauce consistencies.  Some residuals in the   vallecula were identified with nectar.     CONCLUSION: Please refer to speech pathology report for further details.            RADHA MCGEE MD         Electronically Signed and Approved By: RADHA MCGEE MD on 11/11/2021 at 15:23                     XR Chest 1 View [[id number]] Collected: 11/10/21 0959     Updated: 11/10/21 1002    Narrative:      PROCEDURE: XR CHEST 1 VW     COMPARISON: [facility], CR, XR CHEST 1 VW, 11/07/2021, 16:37.     INDICATIONS: SHORTNESS OF BREATH     FINDINGS:      There is increasing effusion/consolidation in the lung bases.  Mild cardiomegaly is present.  No   evidence of pneumothorax.     IMPRESSION:  Findings suggest worsening congestive heart failure.  Superimposed pneumonia is also possible.       WADE ALEXANDRE MD         Electronically Signed and Approved By: WADE ALEXANDRE MD on 11/10/2021 at 9:59                     FL < 1 Hour [[id number]] Resulted: 11/08/21 1721     Updated: 11/08/21 1721    Narrative:      This procedure was auto-finalized with no dictation required.    CT Chest Without Contrast Diagnostic [[id number]] Collected: 11/07/21 1940     Updated: 11/07/21 1943    Narrative:      PROCEDURE: CT CHEST WO CONTRAST DIAGNOSTIC     COMPARISON: [facility], CR, XR CHEST 1 VW, 11/07/2021, 16:37.     INDICATIONS: abnormal chest XR; cough;  history of fall; displaced intertrochanteric right femur   fracture     TECHNIQUE: 561 CT images were created without the administration of contrast material.       PROTOCOL:   Standard imaging protocol performed      RADIATION:   Automated exposure control was utilized to minimize radiation dose.      FINDINGS: Bilateral infiltrates are seen, predominantly in the lung bases, greater on the right.    The findings may represent infectious multifocal pneumonia.  Aspiration pneumonia is possible.  The   CT imaging features are atypical or uncommonly reported for COVID-19 pneumonia.  Alternative   diagnoses should be considered.  Consider correlation with pertinent lab values.  The central   tracheobronchial tree is well aerated without filling defect.  There is an air-fluid level in the   upper thoracic esophagus.  A mildly fluid-distended mid-to-lower thoracic esophagus is present.    The findings may represent gastroesophageal reflux disease.  Esophageal dysmotility is possible.    There is a moderate to large hiatal hernia, which contains fluid and gas.  Borderline cardiomegaly   is possible.  Coronary artery calcifications are seen.  Calcified atherosclerotic plaque involves   the thoracic aorta.  The maximum diameter of the ascending aorta is 3.7 cm, which is at the upper   limits of normal.  There are small to moderate sized mediastinal and hilar lymph nodes.  They may   be reactive in nature.  Slight motion artifact is seen on the study.  Probably no acute findings   are seen within the partially imaged upper abdomen by nonenhanced CT examination.  Minimal if any   pleural effusion is seen.  There is a trace amount of pericardial effusion.  There is some degree   of asymmetry in positioning, which may limit assessment, especially with regard to evaluating the   partially imaged larynx.  There are degenerative changes of the imaged spine.  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  No definite acute fracture is seen.  No aggressive osseous lesion is identified.  No   pneumothorax or pneumomediastinum.     CONCLUSION:   1. Bilateral infiltrates are seen, predominantly in the lung bases, and may represent infectious   multifocal pneumonia.  Aspiration pneumonia is possible.    2. There is a mildly distended esophagus, containing fluid and gas, which may be related to   gastroesophageal reflux disease.  Esophageal dysmotility is possible.    3. There is a moderate to large hiatal hernia.    4. No pleural effusion.    5. A trace amount of pericardial effusion is seen.    6. There may be borderline cardiac enlargement.    7. Atherosclerotic change is seen, especially involving the coronary arteries.    8. The maximum diameter of the ascending aorta is 3.7 cm, which is at the upper limits of normal.    9. There is slight motion artifact on the study.    10. No pneumothorax or pneumomediastinum.    11. No definite acute fracture is seen   12. Please see above comments for further detail.                DOT MULLIGAN JR, MD         Electronically Signed and Approved By: DOT MULLIGAN JR, MD on 11/07/2021 at 19:39                     XR Chest 1 View [[id number]] Collected: 11/07/21 1704     Updated: 11/07/21 1708    Narrative:      PROCEDURE: XR CHEST 1 VW     COMPARISON: None     INDICATIONS: cough     FINDINGS:   The heart is normal in size.     Ill-defined 7 cm opacity in the right lower lobe and 4.5 cm opacity in the left lower lobe may   represent pneumonia, aspiration, or possibly scarring.  We have no prior studies for comparison.     Bony structures appear intact.     CONCLUSION:   Ill-defined opacities are seen in the lower lobes with a nonspecific appearance, as above.                  JERMAN ROQUE MD         Electronically Signed and Approved By: JERMAN ROQUE MD on 11/07/2021 at 17:04                     XR Femur 2 View Right  [[id number]] Collected: 11/07/21 1703     Updated: 11/07/21 1706    Narrative:      PROCEDURE: XR PELVIS 1 OR 2 VW, 11/07/2021, 16:22  XR FEMUR 2 VW RIGHT, 11/07/2021, 16:22     COMPARISON: None     INDICATIONS: RIGHT HIP PAIN POST FALL     FINDINGS:   The sacrum, pelvis, and proximal left femur appear intact.     Moderately displaced fracture is seen in the intertrochanteric region of the proximal right femur.    The superior aspect of the greater trochanter and the lesser trochanter represent separate fracture   fragments.  No underlying lytic lesion is appreciated.     Moderate tricompartmental degenerative change in the knee is consistent with osteoarthritis.     CONCLUSION:   AP pelvis and right femur series demonstrating moderately displaced fracture in the   intertrochanteric proximal right femur, as above.               JERMAN ROQUE MD         Electronically Signed and Approved By: JERMAN ROQUE MD on 11/07/2021 at 17:03                     XR Pelvis 1 or 2 View [[id number]] Collected: 11/07/21 1703     Updated: 11/07/21 1706    Narrative:      PROCEDURE: XR PELVIS 1 OR 2 VW, 11/07/2021, 16:22  XR FEMUR 2 VW RIGHT, 11/07/2021, 16:22     COMPARISON: None     INDICATIONS: RIGHT HIP PAIN POST FALL     FINDINGS:   The sacrum, pelvis, and proximal left femur appear intact.     Moderately displaced fracture is seen in the intertrochanteric region of the proximal right femur.    The superior aspect of the greater trochanter and the lesser trochanter represent separate fracture   fragments.  No underlying lytic lesion is appreciated.     Moderate tricompartmental degenerative change in the knee is consistent with osteoarthritis.     CONCLUSION:   AP pelvis and right femur series demonstrating moderately displaced fracture in the   intertrochanteric proximal right femur, as above.               JERMAN ROQUE MD         Electronically Signed and Approved By: JERMAN ROQUE MD on 11/07/2021 at 17:03                             Labs Pending at Discharge:          Time spent on Discharge including face to face service: More than 30 minutes  Part of this note may be an electronic transcription/translation of spoken language to printed text using the Dragon Dictation System.     TElectronically signed by Kostas Roach MD, 11/12/21, 1:48 PM EST.

## 2021-11-12 NOTE — SIGNIFICANT NOTE
11/12/21 0729   Plan   Plan Comments Patient has rehab bed at [facility] today.   Final Note [facility]

## 2021-11-12 NOTE — PLAN OF CARE
Goal Outcome Evaluation:  Plan of Care Reviewed With: patient            Patient alert and able to make needs known. Aware of transfer to [facility] and agreeable to it.

## 2021-11-30 ENCOUNTER — OFFICE VISIT (OUTPATIENT)
Dept: ORTHOPEDIC SURGERY | Facility: CLINIC | Age: 81
End: 2021-11-30

## 2021-11-30 VITALS — BODY MASS INDEX: 27.11 KG/M2 | HEIGHT: 69 IN | OXYGEN SATURATION: 98 % | WEIGHT: 183 LBS | HEART RATE: 88 BPM

## 2021-11-30 DIAGNOSIS — M25.551 RIGHT HIP PAIN: Primary | ICD-10-CM

## 2021-11-30 DIAGNOSIS — Z98.890 HISTORY OF HIP SURGERY: ICD-10-CM

## 2021-11-30 PROCEDURE — 99024 POSTOP FOLLOW-UP VISIT: CPT | Performed by: ORTHOPAEDIC SURGERY

## 2021-11-30 RX ORDER — ACETAMINOPHEN 325 MG/1
650 TABLET ORAL
COMMUNITY
Start: 2021-11-22

## 2021-11-30 RX ORDER — BISACODYL 5 MG/1
5 TABLET, DELAYED RELEASE ORAL
COMMUNITY
Start: 2021-11-22

## 2021-11-30 RX ORDER — APIXABAN 5 MG/1
TABLET, FILM COATED ORAL
COMMUNITY
Start: 2021-11-24

## 2021-11-30 NOTE — PROGRESS NOTES
"Chief Complaint  Pain of the Right Hip     Subjective      Sukhwinder Marrero presents to [facility] for follow up evaluation of the right hip. The patient is S/P Right Intramedullary Nailing/Rodding 11/8/2021. His staples were removed today. He is here today with his son. He is ambulating with a walker and a wheelchair today. He has no new complaints.    No Known Allergies     Social History     Socioeconomic History   • Marital status:    Tobacco Use   • Smoking status: Former Smoker   • Smokeless tobacco: Never Used   Vaping Use   • Vaping Use: Never used   Substance and Sexual Activity   • Alcohol use: Never   • Drug use: Never   • Sexual activity: Not Currently        Review of Systems     Objective   Vital Signs:   Pulse 88   Ht 174 cm (68.5\")   Wt 83 kg (183 lb)   SpO2 98%   BMI 27.42 kg/m²       Physical Exam  Constitutional:       Appearance: Normal appearance. The patient is well-developed and normal weight.   HENT:      Head: Normocephalic.      Right Ear: Hearing and external ear normal.      Left Ear: Hearing and external ear normal.      Nose: Nose normal.   Eyes:      Conjunctiva/sclera: Conjunctivae normal.   Cardiovascular:      Rate and Rhythm: Normal rate.   Pulmonary:      Effort: Pulmonary effort is normal.      Breath sounds: No wheezing or rales.   Abdominal:      Palpations: Abdomen is soft.      Tenderness: There is no abdominal tenderness.   Musculoskeletal:      Cervical back: Normal range of motion.   Skin:     Findings: No rash.   Neurological:      Mental Status: The patient is alert and oriented to person, place, and time.   Psychiatric:         Mood and Affect: Mood and affect normal.         Judgment: Judgment normal.       Ortho Exam      Right hip- flexion 80. External Rotation 30. Internal rotation 20. Weakness with Leg extension. Neurovascularly intact. Bruising to lateral hip. Mild swelling. Small hematoma to the lateral incision. " Additional incisions are well healing. Full hip extension.     Procedures    X-Ray Report:  Right hip(s) X-Ray  Indication: Evaluation of right hip pain  AP and Lateral view(s)  Findings: intact nail to proximal femur with fracture in good alignment. Displaced avulsion to lesser trochanter.   Prior studies available for comparison: yes         Imaging Results (Most Recent)     Procedure Component Value Units Date/Time    XR Hip With or Without Pelvis 2 - 3 View Right [[id number]] Resulted: 11/30/21 0909     Updated: 11/30/21 0909           Result Review :       XR Femur 2 View Right    Result Date: 11/7/2021  Narrative: PROCEDURE: XR PELVIS 1 OR 2 VW, 11/07/2021, 16:22 XR FEMUR 2 VW RIGHT, 11/07/2021, 16:22  COMPARISON: None  INDICATIONS: RIGHT HIP PAIN POST FALL  FINDINGS:  The sacrum, pelvis, and proximal left femur appear intact.  Moderately displaced fracture is seen in the intertrochanteric region of the proximal right femur.  The superior aspect of the greater trochanter and the lesser trochanter represent separate fracture fragments.  No underlying lytic lesion is appreciated.  Moderate tricompartmental degenerative change in the knee is consistent with osteoarthritis.  CONCLUSION:  AP pelvis and right femur series demonstrating moderately displaced fracture in the intertrochanteric proximal right femur, as above.      JERMAN ROQUE MD       Electronically Signed and Approved By: JERMAN ROQUE MD on 11/07/2021 at 17:03              XR Pelvis 1 or 2 View    Result Date: 11/7/2021  Narrative: PROCEDURE: XR PELVIS 1 OR 2 VW, 11/07/2021, 16:22 XR FEMUR 2 VW RIGHT, 11/07/2021, 16:22  COMPARISON: None  INDICATIONS: RIGHT HIP PAIN POST FALL  FINDINGS:  The sacrum, pelvis, and proximal left femur appear intact.  Moderately displaced fracture is seen in the intertrochanteric region of the proximal right femur.  The superior aspect of the greater trochanter and the lesser trochanter represent separate fracture  fragments.  No underlying lytic lesion is appreciated.  Moderate tricompartmental degenerative change in the knee is consistent with osteoarthritis.  CONCLUSION:  AP pelvis and right femur series demonstrating moderately displaced fracture in the intertrochanteric proximal right femur, as above.      JERMAN ROQUE MD       Electronically Signed and Approved By: JERMAN ROQUE MD on 11/07/2021 at 17:03                      Assessment and Plan     DX: S/P Right Intramedullary Nailing/Rodding    Discussed the treatment plan with the patient.  Plan for weightbearing as tolerated. Plan to continue Home health physical therapy.     Call or return if worsening symptoms.    Follow Up     6 weeks      Patient was given instructions and counseling regarding his condition or for health maintenance advice. Please see specific information pulled into the AVS if appropriate.     Scribed for Aristides Nevarez MD by Shaina Lazo.  11/30/21   09:18 EST    I have personally performed the services described in this document as scribed by the above individual and it is both accurate and complete. Aristides Nevarez MD 11/30/21

## 2022-01-11 PROBLEM — Z47.89 AFTERCARE FOLLOWING SURGERY OF THE MUSCULOSKELETAL SYSTEM: Status: ACTIVE | Noted: 2022-01-11

## 2022-01-13 ENCOUNTER — OFFICE VISIT (OUTPATIENT)
Dept: ORTHOPEDIC SURGERY | Facility: CLINIC | Age: 82
End: 2022-01-13

## 2022-01-13 VITALS — BODY MASS INDEX: 27.42 KG/M2 | OXYGEN SATURATION: 97 % | HEART RATE: 101 BPM | HEIGHT: 69 IN

## 2022-01-13 DIAGNOSIS — Z47.89 AFTERCARE FOLLOWING SURGERY OF THE MUSCULOSKELETAL SYSTEM: Primary | ICD-10-CM

## 2022-01-13 DIAGNOSIS — M25.551 RIGHT HIP PAIN: ICD-10-CM

## 2022-01-13 PROCEDURE — 99024 POSTOP FOLLOW-UP VISIT: CPT | Performed by: PHYSICIAN ASSISTANT

## 2022-01-13 RX ORDER — CHOLECALCIFEROL (VITAMIN D3) 1250 MCG
25 CAPSULE ORAL
COMMUNITY
Start: 2021-11-22

## 2022-01-13 NOTE — PROGRESS NOTES
"Chief Complaint  Follow-up of the Right Hip    Subjective          Sukhwinder Marrero is a 81 y.o. male  presents to [facility] for   History of Present Illness    Patient presents for follow-up evaluation of right intramedullary nailing/rodding of right hip, 11/8/21.  Patient presents with his son to [place].  Patient states he has been doing well he has been having home health physical therapy come twice a week he states that he has been able to ambulate with a walker he presents with a walker today.  Patient denies pain, denies need for pain medication, he was told by Dr. Nevarez that he has a resolving hematoma of his lateral hip he states this has gotten smaller and getting better.  No new complaints today.  No Known Allergies     Social History     Socioeconomic History   • Marital status:    Tobacco Use   • Smoking status: Former Smoker   • Smokeless tobacco: Never Used   Vaping Use   • Vaping Use: Never used   Substance and Sexual Activity   • Alcohol use: Never   • Drug use: Never   • Sexual activity: Not Currently        REVIEW OF SYSTEMS    Constitutional: Denies fevers, chills, weight loss  Cardiovascular: Denies chest pain, shortness of breath  Skin: Denies rashes, acute skin changes  Neurologic: Denies headache, loss of consciousness  MSK: Right hip pain      Objective   Vital Signs:   Pulse 101   Ht 174 cm (68.5\")   SpO2 97%   BMI 27.42 kg/m²     Body mass index is 27.42 kg/m².    Physical Exam    Right hip: Incision is well-healed, no erythema, no ecchymosis, no swelling, small hematoma to the lateral incision, active flexion, flexion 100, external rotation 30, internal rotation 20, mild weakness with leg extension, patient ambulates with mild antalgic gait.  No signs of infection.    Procedures    Imaging Results (Most Recent)     Procedure Component Value Units Date/Time    XR Hip With or Without Pelvis 2 - 3 View Right [[id number]] Resulted: 01/13/22 1717    "  Updated: 01/13/22 1718    Narrative:      • View:AP and Lateral view(s)  • Site: Right hip  • Indication: Right hip pain  • Study: X-rays ordered, taken in the office, and reviewed today  • X-ray: Intact nail to proximal femur with well aligned fracture, good   healing of fracture, displaced avulsion to lesser trochanter  • Comparative data: Compared to previous studies             Result Review :   The following data was reviewed by: MISHA Vidal on 01/13/2022:  Data reviewed: Radiologic studies Reviewed by me with the patient and his son             Assessment and Plan    Diagnoses and all orders for this visit:    1. Aftercare following surgery of right intramedullary nailing/rodding, 11/8/2021 (Primary)  -     Ambulatory Referral to Home Health    2. Right hip pain  -     XR Hip With or Without Pelvis 2 - 3 View Right  -     Ambulatory Referral to Home Health        Reviewed x-rays with the patient and his son, advised them patient would benefit from more home health physical therapy he was given new order for this, continue this for the next 4 to 6 weeks, follow-up in 6 weeks with x-rays.  Follow-up sooner if any new or concerning symptoms occur, patient and son agreed.    Call or return if worsening symptoms.    Follow Up   Return in about 6 weeks (around 2/24/2022) for Recheck.  Patient was given instructions and counseling regarding his condition or for health maintenance advice. Please see specific information pulled into the AVS if appropriate.

## 2022-02-24 ENCOUNTER — OFFICE VISIT (OUTPATIENT)
Dept: ORTHOPEDIC SURGERY | Facility: CLINIC | Age: 82
End: 2022-02-24

## 2022-02-24 VITALS — HEART RATE: 74 BPM | OXYGEN SATURATION: 95 % | HEIGHT: 69 IN | BODY MASS INDEX: 27.11 KG/M2 | WEIGHT: 183 LBS

## 2022-02-24 DIAGNOSIS — M25.551 RIGHT HIP PAIN: ICD-10-CM

## 2022-02-24 DIAGNOSIS — Z47.89 AFTERCARE FOLLOWING SURGERY OF THE MUSCULOSKELETAL SYSTEM: Primary | ICD-10-CM

## 2022-02-24 PROCEDURE — 99213 OFFICE O/P EST LOW 20 MIN: CPT | Performed by: PHYSICIAN ASSISTANT

## 2022-02-24 NOTE — PROGRESS NOTES
"Chief Complaint  Pain of the Right Hip    Subjective          Sukhwinder Marrero is a 81 y.o. male  presents to [facility] for   History of Present Illness    Patient presents with his son for follow-up evaluation of right intramedullary nailing of right hip, 11/8/2021.  Patient presents in a wheelchair, patient states he uses a walker when he is at home for ambulation.  Patient son states home health physical therapy is attending to the patient about 1 time per week he does home exercise when not doing therapy.  Patient states he is ready to discontinue physical therapy and continue home exercises.  Patient denies hip pain, denies trouble with range of motion.  No new complaints today.  No Known Allergies     Social History     Socioeconomic History   • Marital status:    Tobacco Use   • Smoking status: Former Smoker   • Smokeless tobacco: Never Used   Vaping Use   • Vaping Use: Never used   Substance and Sexual Activity   • Alcohol use: Never   • Drug use: Never   • Sexual activity: Not Currently        REVIEW OF SYSTEMS    Constitutional: Denies fevers, chills, weight loss  Cardiovascular: Denies chest pain, shortness of breath  Skin: Denies rashes, acute skin changes  Neurologic: Denies headache, loss of consciousness  MSK: Right hip pain      Objective   Vital Signs:   Pulse 74   Ht 174 cm (68.5\")   Wt 83 kg (183 lb)   SpO2 95%   BMI 27.42 kg/m²     Body mass index is 27.42 kg/m².    Physical Exam    Right hip: Nontender to palpation, no pain with range of motion, active flexion, flexion 110, external rotation 30, internal rotation 20,.  5 out of 5 strength.    Procedures    Imaging Results (Most Recent)     Procedure Component Value Units Date/Time    XR Hip With or Without Pelvis 2 - 3 View Right [[id number]] Resulted: 02/24/22 1136     Updated: 02/24/22 1137    Narrative:      • View:AP and Lateral view(s)  • Site: Right hip  • Indication: Right hip pain  • Study: " X-rays ordered, taken in the office, and reviewed today  • X-ray: Intact nail to proximal femur with well aligned fracture, callus   formation, good healing, displaced avulsion to lesser trochanter, no   increased displacement or angulation.  • Comparative data: No comparative data found             Result Review :   The following data was reviewed by: MISHA Vidal on 02/24/2022:  Data reviewed: Radiologic studies Reviewed by me with the patient and his son             Assessment and Plan    Diagnoses and all orders for this visit:    1. Aftercare following surgery of right intramedullary nailing/rodding, 11/8/2021 (Primary)    2. Right hip pain  -     XR Hip With or Without Pelvis 2 - 3 View Right        Reviewed x-rays with the patient his son, advised them patient may discontinue therapy at any time, if any new or concerning symptoms occur follow-up sooner otherwise follow-up as needed, patient and son agreed.    Call or return if worsening symptoms.    Follow Up   Return if symptoms worsen or fail to improve.  Patient was given instructions and counseling regarding his condition or for health maintenance advice. Please see specific information pulled into the AVS if appropriate.

## (undated) DEVICE — INTENDED FOR TISSUE SEPARATION, AND OTHER PROCEDURES THAT REQUIRE A SHARP SURGICAL BLADE TO PUNCTURE OR CUT.: Brand: BARD-PARKER ® CARBON RIB-BACK BLADES

## (undated) DEVICE — SUT VIC UD BR COAT 0 CP2 27IN

## (undated) DEVICE — GLV SURG SENSICARE SLT PF LF 7.5 STRL

## (undated) DEVICE — GLV SURG SENSICARE PI ORTHO SZ8 LF STRL

## (undated) DEVICE — KT PT POSITION SUPINE HANA/PROFX TABL

## (undated) DEVICE — APPL CHLORAPREP HI/LITE 26ML ORNG

## (undated) DEVICE — PAD GRND REM POLYHESIVE A/ DISP

## (undated) DEVICE — DRSNG GZ PETROLTM XEROFORM CURAD 1X8IN STRL

## (undated) DEVICE — BLANKT WARM PACU MISTRAL/AIR PREM REFL A/ 85.8X50IN

## (undated) DEVICE — 6617 IOBAN II PATIENT ISOLATION DRAPE 5/BX,4BX/CS: Brand: STERI-DRAPE™ IOBAN™ 2

## (undated) DEVICE — MINOR-LF: Brand: MEDLINE INDUSTRIES, INC.

## (undated) DEVICE — 3M™ STERI-DRAPE™ X-RAY IMAGE INTENSIFIER DRAPE, 10 PER CARTON / 4 CARTONS PER CASE, 1013: Brand: STERI-DRAPE™

## (undated) DEVICE — UNDYED BRAIDED (POLYGLACTIN 910), SYNTHETIC ABSORBABLE SUTURE: Brand: COATED VICRYL

## (undated) DEVICE — PROXIMATE RH ROTATING HEAD SKIN STAPLERS (35 WIDE) CONTAINS 35 STAINLESS STEEL STAPLES: Brand: PROXIMATE

## (undated) DEVICE — CVR LEG BOOTLEG F/R NOSKID 33IN

## (undated) DEVICE — 3M(TM) TEGADERM(TM) IV TRANSPARENT FILM DRESSING WITH BORDER 1650: Brand: 3M™ TEGADERM™

## (undated) DEVICE — GW TIB BALL NOSE 3MM 100CM

## (undated) DEVICE — GOWN,REINFORCE,POLY,SIRUS,BREATH SLV,XLG: Brand: MEDLINE

## (undated) DEVICE — 3M™ STERI-DRAPE™ U-DRAPE 1015: Brand: STERI-DRAPE™

## (undated) DEVICE — GLV SURG SENSICARE SLT PF LF 8 STRL

## (undated) DEVICE — GLV SURG SENSICARE ORTHO PF LF 7 STRL

## (undated) DEVICE — MAT FLR ABS W/BLU/LINER 56X72IN WHT

## (undated) DEVICE — SPNG CVR STR 2S 4X4

## (undated) DEVICE — 3M™ IOBAN™ 2 ANTIMICROBIAL INCISE DRAPE 6650EZ: Brand: IOBAN™ 2